# Patient Record
Sex: MALE | Race: WHITE | NOT HISPANIC OR LATINO | Employment: OTHER | URBAN - METROPOLITAN AREA
[De-identification: names, ages, dates, MRNs, and addresses within clinical notes are randomized per-mention and may not be internally consistent; named-entity substitution may affect disease eponyms.]

---

## 2017-01-04 ENCOUNTER — APPOINTMENT (OUTPATIENT)
Dept: LAB | Facility: HOSPITAL | Age: 72
End: 2017-01-04
Payer: MEDICARE

## 2017-01-04 ENCOUNTER — TRANSCRIBE ORDERS (OUTPATIENT)
Dept: LAB | Facility: HOSPITAL | Age: 72
End: 2017-01-04

## 2017-01-04 ENCOUNTER — TRANSCRIBE ORDERS (OUTPATIENT)
Dept: ADMINISTRATIVE | Facility: HOSPITAL | Age: 72
End: 2017-01-04

## 2017-01-04 DIAGNOSIS — D33.2 BENIGN NEOPLASM OF BRAIN, UNSPECIFIED BRAIN REGION (HCC): ICD-10-CM

## 2017-01-04 DIAGNOSIS — I45.5: ICD-10-CM

## 2017-01-04 DIAGNOSIS — C88.0 MACROGLOBULINEMIA (HCC): ICD-10-CM

## 2017-01-04 DIAGNOSIS — D49.6 BRAIN TUMOR (HCC): Primary | ICD-10-CM

## 2017-01-04 DIAGNOSIS — G40.109 SPECIAL SENSORY ATTACKS (HCC): ICD-10-CM

## 2017-01-04 DIAGNOSIS — R73.09 OTHER ABNORMAL GLUCOSE: ICD-10-CM

## 2017-01-04 DIAGNOSIS — R73.09 OTHER ABNORMAL GLUCOSE: Primary | ICD-10-CM

## 2017-01-04 DIAGNOSIS — H65.91 RIGHT OTITIS MEDIA WITH EFFUSION: Primary | ICD-10-CM

## 2017-01-04 LAB
ALBUMIN SERPL BCP-MCNC: 3.2 G/DL (ref 3.5–5)
ALP SERPL-CCNC: 88 U/L (ref 46–116)
ALT SERPL W P-5'-P-CCNC: 20 U/L (ref 12–78)
ANION GAP SERPL CALCULATED.3IONS-SCNC: 7 MMOL/L (ref 4–13)
AST SERPL W P-5'-P-CCNC: 14 U/L (ref 5–45)
BILIRUB SERPL-MCNC: 0.28 MG/DL (ref 0.2–1)
BUN SERPL-MCNC: 12 MG/DL (ref 5–25)
CALCIUM SERPL-MCNC: 9.2 MG/DL (ref 8.3–10.1)
CHLORIDE SERPL-SCNC: 106 MMOL/L (ref 100–108)
CO2 SERPL-SCNC: 27 MMOL/L (ref 21–32)
CREAT SERPL-MCNC: 0.88 MG/DL (ref 0.6–1.3)
GFR SERPL CREATININE-BSD FRML MDRD: >60 ML/MIN/1.73SQ M
GLUCOSE SERPL-MCNC: 106 MG/DL (ref 65–140)
POTASSIUM SERPL-SCNC: 4.1 MMOL/L (ref 3.5–5.3)
PROT SERPL-MCNC: 9 G/DL (ref 6.4–8.2)
SODIUM SERPL-SCNC: 140 MMOL/L (ref 136–145)

## 2017-01-04 PROCEDURE — 85810 BLOOD VISCOSITY EXAMINATION: CPT

## 2017-01-04 PROCEDURE — 80053 COMPREHEN METABOLIC PANEL: CPT

## 2017-01-04 PROCEDURE — 36415 COLL VENOUS BLD VENIPUNCTURE: CPT

## 2017-01-05 LAB — VISC SER: 2.4 REL.SALINE (ref 1.6–1.9)

## 2017-01-26 ENCOUNTER — HOSPITAL ENCOUNTER (OUTPATIENT)
Dept: CT IMAGING | Facility: HOSPITAL | Age: 72
Discharge: HOME | End: 2017-01-26
Payer: MEDICARE

## 2017-01-26 DIAGNOSIS — H65.91 RIGHT OTITIS MEDIA WITH EFFUSION: ICD-10-CM

## 2017-01-26 PROCEDURE — 70480 CT ORBIT/EAR/FOSSA W/O DYE: CPT

## 2017-03-23 ENCOUNTER — APPOINTMENT (OUTPATIENT)
Dept: LAB | Facility: HOSPITAL | Age: 72
End: 2017-03-23
Payer: MEDICARE

## 2017-03-23 ENCOUNTER — TRANSCRIBE ORDERS (OUTPATIENT)
Dept: LAB | Facility: HOSPITAL | Age: 72
End: 2017-03-23

## 2017-03-23 DIAGNOSIS — D32.9 BENIGN NEOPLASM OF MENINGES (HCC): ICD-10-CM

## 2017-03-23 DIAGNOSIS — D32.9 BENIGN NEOPLASM OF MENINGES (HCC): Primary | ICD-10-CM

## 2017-03-23 LAB
BUN SERPL-MCNC: 18 MG/DL (ref 5–25)
CREAT SERPL-MCNC: 0.94 MG/DL (ref 0.6–1.3)
GFR SERPL CREATININE-BSD FRML MDRD: >60 ML/MIN/1.73SQ M

## 2017-03-23 PROCEDURE — 36415 COLL VENOUS BLD VENIPUNCTURE: CPT

## 2017-03-23 PROCEDURE — 84520 ASSAY OF UREA NITROGEN: CPT

## 2017-03-23 PROCEDURE — 82565 ASSAY OF CREATININE: CPT

## 2017-03-31 ENCOUNTER — HOSPITAL ENCOUNTER (OUTPATIENT)
Dept: MRI IMAGING | Facility: HOSPITAL | Age: 72
Discharge: HOME | End: 2017-03-31
Payer: MEDICARE

## 2017-03-31 DIAGNOSIS — D49.6 BRAIN TUMOR (HCC): ICD-10-CM

## 2017-03-31 PROCEDURE — 70553 MRI BRAIN STEM W/O & W/DYE: CPT

## 2017-03-31 PROCEDURE — A9585 GADOBUTROL INJECTION: HCPCS | Performed by: RADIOLOGY

## 2017-03-31 RX ADMIN — GADOBUTROL 11 ML: 604.72 INJECTION INTRAVENOUS at 20:40

## 2017-10-05 ENCOUNTER — APPOINTMENT (OUTPATIENT)
Dept: LAB | Facility: HOSPITAL | Age: 72
End: 2017-10-05
Payer: MEDICARE

## 2017-10-05 ENCOUNTER — TRANSCRIBE ORDERS (OUTPATIENT)
Dept: LAB | Facility: HOSPITAL | Age: 72
End: 2017-10-05

## 2017-10-05 DIAGNOSIS — D33.3 CEREBELLOPONTINE ANGLE SYNDROME (HCC): ICD-10-CM

## 2017-10-05 DIAGNOSIS — R60.9 EDEMA, UNSPECIFIED TYPE: Primary | ICD-10-CM

## 2017-10-05 DIAGNOSIS — R60.9 EDEMA, UNSPECIFIED TYPE: ICD-10-CM

## 2017-10-05 DIAGNOSIS — R73.09 OTHER ABNORMAL GLUCOSE: ICD-10-CM

## 2017-10-05 DIAGNOSIS — C88.0 MACROGLOBULINEMIA (HCC): ICD-10-CM

## 2017-10-05 DIAGNOSIS — G40.109 SPECIAL SENSORY ATTACKS (HCC): ICD-10-CM

## 2017-10-05 LAB
ANION GAP SERPL CALCULATED.3IONS-SCNC: 5 MMOL/L (ref 4–13)
ANION GAP SERPL CALCULATED.3IONS-SCNC: 8 MMOL/L (ref 4–13)
BUN SERPL-MCNC: 13 MG/DL (ref 5–25)
CALCIUM SERPL-MCNC: 9.4 MG/DL (ref 8.3–10.1)
CHLORIDE SERPL-SCNC: 103 MMOL/L (ref 100–108)
CHLORIDE SERPL-SCNC: 103 MMOL/L (ref 100–108)
CO2 SERPL-SCNC: 27 MMOL/L (ref 21–32)
CO2 SERPL-SCNC: 29 MMOL/L (ref 21–32)
CREAT SERPL-MCNC: 1.07 MG/DL (ref 0.6–1.3)
GFR SERPL CREATININE-BSD FRML MDRD: 68 ML/MIN/1.73SQ M
GLUCOSE SERPL-MCNC: 108 MG/DL (ref 65–140)
POTASSIUM SERPL-SCNC: 4 MMOL/L (ref 3.5–5.3)
POTASSIUM SERPL-SCNC: 4.1 MMOL/L (ref 3.5–5.3)
SODIUM SERPL-SCNC: 137 MMOL/L (ref 136–145)
SODIUM SERPL-SCNC: 138 MMOL/L (ref 136–145)

## 2017-10-05 PROCEDURE — 80051 ELECTROLYTE PANEL: CPT

## 2017-10-05 PROCEDURE — 80048 BASIC METABOLIC PNL TOTAL CA: CPT

## 2017-10-05 PROCEDURE — 36415 COLL VENOUS BLD VENIPUNCTURE: CPT

## 2017-11-28 ENCOUNTER — HOSPITAL ENCOUNTER (OUTPATIENT)
Dept: NON INVASIVE DIAGNOSTICS | Facility: CLINIC | Age: 72
Discharge: HOME | End: 2017-11-28
Payer: MEDICARE

## 2017-11-28 ENCOUNTER — TRANSCRIBE ORDERS (OUTPATIENT)
Dept: ADMINISTRATIVE | Facility: HOSPITAL | Age: 72
End: 2017-11-28

## 2017-11-28 DIAGNOSIS — R60.9 EDEMA, UNSPECIFIED TYPE: ICD-10-CM

## 2017-11-28 DIAGNOSIS — I82.4Y2 DEEP VEIN THROMBOSIS (DVT) OF PROXIMAL VEIN OF LEFT LOWER EXTREMITY, UNSPECIFIED CHRONICITY (HCC): ICD-10-CM

## 2017-11-28 DIAGNOSIS — R60.9 EDEMA, UNSPECIFIED TYPE: Primary | ICD-10-CM

## 2017-11-28 PROCEDURE — 93970 EXTREMITY STUDY: CPT

## 2017-12-05 ENCOUNTER — APPOINTMENT (OUTPATIENT)
Dept: LAB | Facility: HOSPITAL | Age: 72
End: 2017-12-05
Payer: MEDICARE

## 2017-12-05 DIAGNOSIS — R60.9 EDEMA, UNSPECIFIED TYPE: ICD-10-CM

## 2017-12-05 DIAGNOSIS — I82.4Y2 DEEP VEIN THROMBOSIS (DVT) OF PROXIMAL VEIN OF LEFT LOWER EXTREMITY, UNSPECIFIED CHRONICITY (HCC): ICD-10-CM

## 2017-12-05 LAB
ANION GAP SERPL CALCULATED.3IONS-SCNC: 7 MMOL/L (ref 4–13)
BUN SERPL-MCNC: 17 MG/DL (ref 5–25)
CALCIUM SERPL-MCNC: 9.6 MG/DL (ref 8.3–10.1)
CHLORIDE SERPL-SCNC: 94 MMOL/L (ref 100–108)
CO2 SERPL-SCNC: 33 MMOL/L (ref 21–32)
CREAT SERPL-MCNC: 1.19 MG/DL (ref 0.6–1.3)
GFR SERPL CREATININE-BSD FRML MDRD: 60 ML/MIN/1.73SQ M
GLUCOSE SERPL-MCNC: 107 MG/DL (ref 65–140)
POTASSIUM SERPL-SCNC: 4.1 MMOL/L (ref 3.5–5.3)
SODIUM SERPL-SCNC: 134 MMOL/L (ref 136–145)

## 2017-12-05 PROCEDURE — 80048 BASIC METABOLIC PNL TOTAL CA: CPT

## 2017-12-05 PROCEDURE — 36415 COLL VENOUS BLD VENIPUNCTURE: CPT

## 2017-12-12 ENCOUNTER — APPOINTMENT (OUTPATIENT)
Dept: LAB | Facility: HOSPITAL | Age: 72
End: 2017-12-12
Payer: MEDICARE

## 2017-12-12 ENCOUNTER — TRANSCRIBE ORDERS (OUTPATIENT)
Dept: LAB | Facility: HOSPITAL | Age: 72
End: 2017-12-12

## 2017-12-12 DIAGNOSIS — I82.4Z2 DEEP VEIN THROMBOSIS (DVT) OF DISTAL VEIN OF LEFT LOWER EXTREMITY, UNSPECIFIED CHRONICITY (HCC): ICD-10-CM

## 2017-12-12 DIAGNOSIS — R60.9 EDEMA, UNSPECIFIED TYPE: Primary | ICD-10-CM

## 2017-12-12 DIAGNOSIS — R60.9 EDEMA, UNSPECIFIED TYPE: ICD-10-CM

## 2017-12-12 LAB
ANION GAP SERPL CALCULATED.3IONS-SCNC: 6 MMOL/L (ref 4–13)
BUN SERPL-MCNC: 12 MG/DL (ref 5–25)
CALCIUM SERPL-MCNC: 9.4 MG/DL (ref 8.3–10.1)
CHLORIDE SERPL-SCNC: 96 MMOL/L (ref 100–108)
CO2 SERPL-SCNC: 33 MMOL/L (ref 21–32)
CREAT SERPL-MCNC: 1.02 MG/DL (ref 0.6–1.3)
GFR SERPL CREATININE-BSD FRML MDRD: 72 ML/MIN/1.73SQ M
GLUCOSE SERPL-MCNC: 111 MG/DL (ref 65–140)
POTASSIUM SERPL-SCNC: 2.9 MMOL/L (ref 3.5–5.3)
SODIUM SERPL-SCNC: 135 MMOL/L (ref 136–145)

## 2017-12-12 PROCEDURE — 36415 COLL VENOUS BLD VENIPUNCTURE: CPT

## 2017-12-12 PROCEDURE — 80048 BASIC METABOLIC PNL TOTAL CA: CPT

## 2017-12-18 ENCOUNTER — APPOINTMENT (OUTPATIENT)
Dept: LAB | Facility: HOSPITAL | Age: 72
End: 2017-12-18
Payer: MEDICARE

## 2017-12-18 DIAGNOSIS — I82.4Z2 DEEP VEIN THROMBOSIS (DVT) OF DISTAL VEIN OF LEFT LOWER EXTREMITY, UNSPECIFIED CHRONICITY (HCC): ICD-10-CM

## 2017-12-18 DIAGNOSIS — R60.9 EDEMA, UNSPECIFIED TYPE: ICD-10-CM

## 2017-12-18 LAB
ANION GAP SERPL CALCULATED.3IONS-SCNC: 6 MMOL/L (ref 4–13)
BUN SERPL-MCNC: 17 MG/DL (ref 5–25)
CALCIUM SERPL-MCNC: 9.6 MG/DL (ref 8.3–10.1)
CHLORIDE SERPL-SCNC: 101 MMOL/L (ref 100–108)
CO2 SERPL-SCNC: 31 MMOL/L (ref 21–32)
CREAT SERPL-MCNC: 0.98 MG/DL (ref 0.6–1.3)
GFR SERPL CREATININE-BSD FRML MDRD: 76 ML/MIN/1.73SQ M
GLUCOSE SERPL-MCNC: 94 MG/DL (ref 65–140)
POTASSIUM SERPL-SCNC: 3.4 MMOL/L (ref 3.5–5.3)
SODIUM SERPL-SCNC: 138 MMOL/L (ref 136–145)

## 2017-12-18 PROCEDURE — 80048 BASIC METABOLIC PNL TOTAL CA: CPT

## 2017-12-18 PROCEDURE — 36415 COLL VENOUS BLD VENIPUNCTURE: CPT

## 2017-12-19 ENCOUNTER — TRANSCRIBE ORDERS (OUTPATIENT)
Dept: ADMINISTRATIVE | Facility: HOSPITAL | Age: 72
End: 2017-12-19

## 2017-12-19 DIAGNOSIS — R60.9 EDEMA, UNSPECIFIED TYPE: Primary | ICD-10-CM

## 2017-12-28 ENCOUNTER — GENERIC CONVERSION - ENCOUNTER (OUTPATIENT)
Dept: OTHER | Facility: OTHER | Age: 72
End: 2017-12-28

## 2017-12-28 ENCOUNTER — HOSPITAL ENCOUNTER (OUTPATIENT)
Dept: NON INVASIVE DIAGNOSTICS | Facility: CLINIC | Age: 72
Discharge: HOME | End: 2017-12-28
Payer: MEDICARE

## 2017-12-28 DIAGNOSIS — R60.9 EDEMA, UNSPECIFIED TYPE: ICD-10-CM

## 2017-12-28 PROCEDURE — 93306 TTE W/DOPPLER COMPLETE: CPT

## 2018-01-02 ENCOUNTER — APPOINTMENT (OUTPATIENT)
Dept: LAB | Facility: HOSPITAL | Age: 73
End: 2018-01-02
Payer: MEDICARE

## 2018-01-02 DIAGNOSIS — R60.9 EDEMA, UNSPECIFIED TYPE: ICD-10-CM

## 2018-01-02 DIAGNOSIS — I82.4Z2 DEEP VEIN THROMBOSIS (DVT) OF DISTAL VEIN OF LEFT LOWER EXTREMITY, UNSPECIFIED CHRONICITY (HCC): ICD-10-CM

## 2018-01-02 LAB
ANION GAP SERPL CALCULATED.3IONS-SCNC: 6 MMOL/L (ref 4–13)
BUN SERPL-MCNC: 16 MG/DL (ref 5–25)
CALCIUM SERPL-MCNC: 9.8 MG/DL (ref 8.3–10.1)
CHLORIDE SERPL-SCNC: 97 MMOL/L (ref 100–108)
CO2 SERPL-SCNC: 34 MMOL/L (ref 21–32)
CREAT SERPL-MCNC: 1.02 MG/DL (ref 0.6–1.3)
GFR SERPL CREATININE-BSD FRML MDRD: 72 ML/MIN/1.73SQ M
GLUCOSE SERPL-MCNC: 103 MG/DL (ref 65–140)
POTASSIUM SERPL-SCNC: 3.2 MMOL/L (ref 3.5–5.3)
SODIUM SERPL-SCNC: 137 MMOL/L (ref 136–145)

## 2018-01-02 PROCEDURE — 36415 COLL VENOUS BLD VENIPUNCTURE: CPT

## 2018-01-02 PROCEDURE — 80048 BASIC METABOLIC PNL TOTAL CA: CPT

## 2018-01-25 ENCOUNTER — TRANSCRIBE ORDERS (OUTPATIENT)
Dept: LAB | Facility: HOSPITAL | Age: 73
End: 2018-01-25

## 2018-01-25 ENCOUNTER — APPOINTMENT (OUTPATIENT)
Dept: LAB | Facility: HOSPITAL | Age: 73
End: 2018-01-25
Payer: MEDICARE

## 2018-01-25 DIAGNOSIS — R60.9 EDEMA, UNSPECIFIED TYPE: ICD-10-CM

## 2018-01-25 DIAGNOSIS — I82.492 DEEP VEIN THROMBOSIS (DVT) OF OTHER VEIN OF LEFT LOWER EXTREMITY, UNSPECIFIED CHRONICITY (HCC): ICD-10-CM

## 2018-01-25 DIAGNOSIS — R60.9 EDEMA, UNSPECIFIED TYPE: Primary | ICD-10-CM

## 2018-01-25 LAB
ANION GAP SERPL CALCULATED.3IONS-SCNC: 7 MMOL/L (ref 4–13)
BUN SERPL-MCNC: 12 MG/DL (ref 5–25)
CALCIUM SERPL-MCNC: 9.6 MG/DL (ref 8.3–10.1)
CHLORIDE SERPL-SCNC: 96 MMOL/L (ref 100–108)
CO2 SERPL-SCNC: 33 MMOL/L (ref 21–32)
CREAT SERPL-MCNC: 1.03 MG/DL (ref 0.6–1.3)
GFR SERPL CREATININE-BSD FRML MDRD: 71 ML/MIN/1.73SQ M
GLUCOSE SERPL-MCNC: 93 MG/DL (ref 65–140)
POTASSIUM SERPL-SCNC: 4.2 MMOL/L (ref 3.5–5.3)
SODIUM SERPL-SCNC: 136 MMOL/L (ref 136–145)

## 2018-01-25 PROCEDURE — 80048 BASIC METABOLIC PNL TOTAL CA: CPT

## 2018-01-25 PROCEDURE — 36415 COLL VENOUS BLD VENIPUNCTURE: CPT

## 2018-03-09 ENCOUNTER — TRANSCRIBE ORDERS (OUTPATIENT)
Dept: LAB | Facility: HOSPITAL | Age: 73
End: 2018-03-09

## 2018-03-09 ENCOUNTER — APPOINTMENT (OUTPATIENT)
Dept: LAB | Facility: HOSPITAL | Age: 73
End: 2018-03-09
Payer: MEDICARE

## 2018-03-09 DIAGNOSIS — E40 EDEMA DUE TO KWASHIORKOR (HCC): ICD-10-CM

## 2018-03-09 DIAGNOSIS — I82.402 DEEP VEIN THROMBOSIS (DVT) OF LEFT LOWER EXTREMITY, UNSPECIFIED CHRONICITY, UNSPECIFIED VEIN (HCC): ICD-10-CM

## 2018-03-09 DIAGNOSIS — R60.9 EDEMA, UNSPECIFIED TYPE: ICD-10-CM

## 2018-03-09 DIAGNOSIS — I82.4Z2 DEEP VEIN THROMBOSIS (DVT) OF DISTAL VEIN OF LEFT LOWER EXTREMITY, UNSPECIFIED CHRONICITY (HCC): ICD-10-CM

## 2018-03-09 DIAGNOSIS — E40 EDEMA DUE TO KWASHIORKOR (HCC): Primary | ICD-10-CM

## 2018-03-09 LAB
ANION GAP SERPL CALCULATED.3IONS-SCNC: 9 MMOL/L (ref 4–13)
BUN SERPL-MCNC: 12 MG/DL (ref 5–25)
CALCIUM SERPL-MCNC: 9.4 MG/DL (ref 8.3–10.1)
CHLORIDE SERPL-SCNC: 95 MMOL/L (ref 100–108)
CO2 SERPL-SCNC: 29 MMOL/L (ref 21–32)
CREAT SERPL-MCNC: 1.07 MG/DL (ref 0.6–1.3)
GFR SERPL CREATININE-BSD FRML MDRD: 68 ML/MIN/1.73SQ M
GLUCOSE P FAST SERPL-MCNC: 91 MG/DL (ref 65–99)
POTASSIUM SERPL-SCNC: 2.7 MMOL/L (ref 3.5–5.3)
SODIUM SERPL-SCNC: 133 MMOL/L (ref 136–145)

## 2018-03-09 PROCEDURE — 36415 COLL VENOUS BLD VENIPUNCTURE: CPT

## 2018-03-09 PROCEDURE — 80048 BASIC METABOLIC PNL TOTAL CA: CPT

## 2018-03-13 ENCOUNTER — APPOINTMENT (OUTPATIENT)
Dept: LAB | Facility: HOSPITAL | Age: 73
End: 2018-03-13
Payer: MEDICARE

## 2018-03-13 DIAGNOSIS — E40 EDEMA DUE TO KWASHIORKOR (HCC): ICD-10-CM

## 2018-03-13 DIAGNOSIS — I82.402 DEEP VEIN THROMBOSIS (DVT) OF LEFT LOWER EXTREMITY, UNSPECIFIED CHRONICITY, UNSPECIFIED VEIN (HCC): ICD-10-CM

## 2018-03-13 LAB
ANION GAP SERPL CALCULATED.3IONS-SCNC: 6 MMOL/L (ref 4–13)
BUN SERPL-MCNC: 14 MG/DL (ref 5–25)
CALCIUM SERPL-MCNC: 9.6 MG/DL (ref 8.3–10.1)
CHLORIDE SERPL-SCNC: 98 MMOL/L (ref 100–108)
CO2 SERPL-SCNC: 31 MMOL/L (ref 21–32)
CREAT SERPL-MCNC: 0.98 MG/DL (ref 0.6–1.3)
GFR SERPL CREATININE-BSD FRML MDRD: 76 ML/MIN/1.73SQ M
GLUCOSE P FAST SERPL-MCNC: 102 MG/DL (ref 65–99)
POTASSIUM SERPL-SCNC: 3.1 MMOL/L (ref 3.5–5.3)
SODIUM SERPL-SCNC: 135 MMOL/L (ref 136–145)

## 2018-03-13 PROCEDURE — 80048 BASIC METABOLIC PNL TOTAL CA: CPT

## 2018-03-13 PROCEDURE — 36415 COLL VENOUS BLD VENIPUNCTURE: CPT

## 2018-03-16 ENCOUNTER — APPOINTMENT (OUTPATIENT)
Dept: LAB | Facility: HOSPITAL | Age: 73
End: 2018-03-16
Payer: MEDICARE

## 2018-03-16 DIAGNOSIS — R60.9 EDEMA, UNSPECIFIED TYPE: ICD-10-CM

## 2018-03-16 DIAGNOSIS — I82.4Z2 DEEP VEIN THROMBOSIS (DVT) OF DISTAL VEIN OF LEFT LOWER EXTREMITY, UNSPECIFIED CHRONICITY (HCC): ICD-10-CM

## 2018-03-16 DIAGNOSIS — E40 EDEMA DUE TO KWASHIORKOR (HCC): ICD-10-CM

## 2018-03-16 DIAGNOSIS — I82.402 DEEP VEIN THROMBOSIS (DVT) OF LEFT LOWER EXTREMITY, UNSPECIFIED CHRONICITY, UNSPECIFIED VEIN (HCC): ICD-10-CM

## 2018-03-16 LAB
ANION GAP SERPL CALCULATED.3IONS-SCNC: 5 MMOL/L (ref 4–13)
BUN SERPL-MCNC: 6 MG/DL (ref 5–25)
CALCIUM SERPL-MCNC: 9.2 MG/DL (ref 8.3–10.1)
CHLORIDE SERPL-SCNC: 102 MMOL/L (ref 100–108)
CO2 SERPL-SCNC: 29 MMOL/L (ref 21–32)
CREAT SERPL-MCNC: 0.99 MG/DL (ref 0.6–1.3)
GFR SERPL CREATININE-BSD FRML MDRD: 75 ML/MIN/1.73SQ M
GLUCOSE SERPL-MCNC: 80 MG/DL (ref 65–140)
POTASSIUM SERPL-SCNC: 3.6 MMOL/L (ref 3.5–5.3)
SODIUM SERPL-SCNC: 136 MMOL/L (ref 136–145)

## 2018-03-16 PROCEDURE — 80048 BASIC METABOLIC PNL TOTAL CA: CPT

## 2018-03-16 PROCEDURE — 36415 COLL VENOUS BLD VENIPUNCTURE: CPT

## 2018-03-23 ENCOUNTER — APPOINTMENT (OUTPATIENT)
Dept: LAB | Facility: HOSPITAL | Age: 73
End: 2018-03-23
Payer: MEDICARE

## 2018-03-23 DIAGNOSIS — R60.9 EDEMA, UNSPECIFIED TYPE: ICD-10-CM

## 2018-03-23 DIAGNOSIS — I82.402 DEEP VEIN THROMBOSIS (DVT) OF LEFT LOWER EXTREMITY, UNSPECIFIED CHRONICITY, UNSPECIFIED VEIN (HCC): ICD-10-CM

## 2018-03-23 DIAGNOSIS — I82.4Z2 DEEP VEIN THROMBOSIS (DVT) OF DISTAL VEIN OF LEFT LOWER EXTREMITY, UNSPECIFIED CHRONICITY (HCC): ICD-10-CM

## 2018-03-23 DIAGNOSIS — E40 EDEMA DUE TO KWASHIORKOR (HCC): ICD-10-CM

## 2018-03-23 LAB
ANION GAP SERPL CALCULATED.3IONS-SCNC: 8 MMOL/L (ref 4–13)
BUN SERPL-MCNC: 12 MG/DL (ref 5–25)
CALCIUM SERPL-MCNC: 9 MG/DL (ref 8.3–10.1)
CHLORIDE SERPL-SCNC: 98 MMOL/L (ref 100–108)
CO2 SERPL-SCNC: 29 MMOL/L (ref 21–32)
CREAT SERPL-MCNC: 1.12 MG/DL (ref 0.6–1.3)
GFR SERPL CREATININE-BSD FRML MDRD: 64 ML/MIN/1.73SQ M
GLUCOSE P FAST SERPL-MCNC: 111 MG/DL (ref 65–99)
POTASSIUM SERPL-SCNC: 3.3 MMOL/L (ref 3.5–5.3)
SODIUM SERPL-SCNC: 135 MMOL/L (ref 136–145)

## 2018-03-23 PROCEDURE — 80048 BASIC METABOLIC PNL TOTAL CA: CPT

## 2018-03-23 PROCEDURE — 36415 COLL VENOUS BLD VENIPUNCTURE: CPT

## 2018-04-02 ENCOUNTER — APPOINTMENT (OUTPATIENT)
Dept: LAB | Facility: HOSPITAL | Age: 73
End: 2018-04-02
Payer: MEDICARE

## 2018-04-02 ENCOUNTER — TRANSCRIBE ORDERS (OUTPATIENT)
Dept: LAB | Facility: HOSPITAL | Age: 73
End: 2018-04-02

## 2018-04-02 DIAGNOSIS — R60.9 EDEMA, UNSPECIFIED TYPE: Primary | ICD-10-CM

## 2018-04-02 DIAGNOSIS — R60.9 EDEMA, UNSPECIFIED TYPE: ICD-10-CM

## 2018-04-02 LAB
ANION GAP SERPL CALCULATED.3IONS-SCNC: 6 MMOL/L (ref 4–13)
BUN SERPL-MCNC: 14 MG/DL (ref 5–25)
CALCIUM SERPL-MCNC: 8.9 MG/DL (ref 8.3–10.1)
CHLORIDE SERPL-SCNC: 103 MMOL/L (ref 100–108)
CO2 SERPL-SCNC: 29 MMOL/L (ref 21–32)
CREAT SERPL-MCNC: 1.03 MG/DL (ref 0.6–1.3)
GFR SERPL CREATININE-BSD FRML MDRD: 71 ML/MIN/1.73SQ M
GLUCOSE SERPL-MCNC: 91 MG/DL (ref 65–140)
POTASSIUM SERPL-SCNC: 3.3 MMOL/L (ref 3.5–5.3)
SODIUM SERPL-SCNC: 138 MMOL/L (ref 136–145)

## 2018-04-02 PROCEDURE — 36415 COLL VENOUS BLD VENIPUNCTURE: CPT

## 2018-04-02 PROCEDURE — 80048 BASIC METABOLIC PNL TOTAL CA: CPT

## 2018-04-16 ENCOUNTER — APPOINTMENT (OUTPATIENT)
Dept: LAB | Facility: HOSPITAL | Age: 73
End: 2018-04-16
Payer: MEDICARE

## 2018-04-16 DIAGNOSIS — R60.9 EDEMA, UNSPECIFIED TYPE: ICD-10-CM

## 2018-04-16 LAB
ANION GAP SERPL CALCULATED.3IONS-SCNC: 7 MMOL/L (ref 4–13)
BUN SERPL-MCNC: 13 MG/DL (ref 5–25)
CALCIUM SERPL-MCNC: 9.4 MG/DL
CHLORIDE SERPL-SCNC: 99 MMOL/L (ref 100–108)
CO2 SERPL-SCNC: 30 MMOL/L (ref 21–32)
CREAT SERPL-MCNC: 1.05 MG/DL (ref 0.6–1.3)
GFR SERPL CREATININE-BSD FRML MDRD: 70 ML/MIN/1.73SQ M
GLUCOSE P FAST SERPL-MCNC: 112 MG/DL (ref 65–99)
POTASSIUM SERPL-SCNC: 3.1 MMOL/L (ref 3.5–5.3)
SODIUM SERPL-SCNC: 136 MMOL/L (ref 136–145)

## 2018-04-16 PROCEDURE — 36415 COLL VENOUS BLD VENIPUNCTURE: CPT

## 2018-04-16 PROCEDURE — 80048 BASIC METABOLIC PNL TOTAL CA: CPT

## 2018-04-23 ENCOUNTER — APPOINTMENT (OUTPATIENT)
Dept: LAB | Facility: HOSPITAL | Age: 73
End: 2018-04-23
Payer: MEDICARE

## 2018-04-23 DIAGNOSIS — R60.9 EDEMA, UNSPECIFIED TYPE: ICD-10-CM

## 2018-04-23 LAB
ANION GAP SERPL CALCULATED.3IONS-SCNC: 8 MMOL/L (ref 4–13)
BUN SERPL-MCNC: 8 MG/DL (ref 5–25)
CALCIUM SERPL-MCNC: 9.5 MG/DL (ref 8.3–10.1)
CHLORIDE SERPL-SCNC: 103 MMOL/L (ref 100–108)
CO2 SERPL-SCNC: 26 MMOL/L (ref 21–32)
CREAT SERPL-MCNC: 1 MG/DL (ref 0.6–1.3)
GFR SERPL CREATININE-BSD FRML MDRD: 74 ML/MIN/1.73SQ M
GLUCOSE P FAST SERPL-MCNC: 104 MG/DL (ref 65–99)
POTASSIUM SERPL-SCNC: 3.6 MMOL/L (ref 3.5–5.3)
SODIUM SERPL-SCNC: 137 MMOL/L (ref 136–145)

## 2018-04-23 PROCEDURE — 80048 BASIC METABOLIC PNL TOTAL CA: CPT

## 2018-04-23 PROCEDURE — 36415 COLL VENOUS BLD VENIPUNCTURE: CPT

## 2018-04-29 ENCOUNTER — HOSPITAL ENCOUNTER (EMERGENCY)
Facility: HOSPITAL | Age: 73
Discharge: HOME | End: 2018-04-29
Attending: EMERGENCY MEDICINE
Payer: MEDICARE

## 2018-04-29 VITALS
HEART RATE: 74 BPM | RESPIRATION RATE: 20 BRPM | TEMPERATURE: 97.5 F | DIASTOLIC BLOOD PRESSURE: 73 MMHG | SYSTOLIC BLOOD PRESSURE: 158 MMHG | OXYGEN SATURATION: 96 % | WEIGHT: 240 LBS | BODY MASS INDEX: 38.57 KG/M2 | HEIGHT: 66 IN

## 2018-04-29 DIAGNOSIS — I87.2 VENOUS (PERIPHERAL) INSUFFICIENCY: ICD-10-CM

## 2018-04-29 DIAGNOSIS — L03.115 CELLULITIS OF BOTH LOWER EXTREMITIES: Primary | ICD-10-CM

## 2018-04-29 DIAGNOSIS — L03.116 CELLULITIS OF BOTH LOWER EXTREMITIES: Primary | ICD-10-CM

## 2018-04-29 LAB
ANION GAP SERPL CALCULATED.3IONS-SCNC: 6 MMOL/L (ref 4–13)
BASOPHILS # BLD AUTO: 0.04 THOUSANDS/ΜL (ref 0–0.1)
BASOPHILS NFR BLD AUTO: 1 % (ref 0–1)
BUN SERPL-MCNC: 15 MG/DL (ref 5–25)
CALCIUM SERPL-MCNC: 9.1 MG/DL (ref 8.3–10.1)
CHLORIDE SERPL-SCNC: 99 MMOL/L (ref 100–108)
CO2 SERPL-SCNC: 33 MMOL/L (ref 21–32)
CREAT SERPL-MCNC: 1.21 MG/DL (ref 0.6–1.3)
EOSINOPHIL # BLD AUTO: 0.26 THOUSAND/ΜL (ref 0–0.61)
EOSINOPHIL NFR BLD AUTO: 3 % (ref 0–6)
ERYTHROCYTE [DISTWIDTH] IN BLOOD BY AUTOMATED COUNT: 15.2 % (ref 11.6–15.1)
GFR SERPL CREATININE-BSD FRML MDRD: 59 ML/MIN/1.73SQ M
GLUCOSE SERPL-MCNC: 91 MG/DL (ref 65–140)
HCT VFR BLD AUTO: 39.4 % (ref 36.5–49.3)
HGB BLD-MCNC: 12.9 G/DL (ref 12–17)
LYMPHOCYTES # BLD AUTO: 1.56 THOUSANDS/ΜL (ref 0.6–4.47)
LYMPHOCYTES NFR BLD AUTO: 19 % (ref 14–44)
MCH RBC QN AUTO: 30.8 PG (ref 26.8–34.3)
MCHC RBC AUTO-ENTMCNC: 32.7 G/DL (ref 31.4–37.4)
MCV RBC AUTO: 94 FL (ref 82–98)
MONOCYTES # BLD AUTO: 0.55 THOUSAND/ΜL (ref 0.17–1.22)
MONOCYTES NFR BLD AUTO: 7 % (ref 4–12)
NEUTROPHILS # BLD AUTO: 5.92 THOUSANDS/ΜL (ref 1.85–7.62)
NEUTS SEG NFR BLD AUTO: 70 % (ref 43–75)
NRBC BLD AUTO-RTO: 0 /100 WBCS
PLATELET # BLD AUTO: 319 THOUSANDS/UL (ref 149–390)
PMV BLD AUTO: 9.7 FL (ref 8.9–12.7)
POTASSIUM SERPL-SCNC: 3.5 MMOL/L (ref 3.5–5.3)
RBC # BLD AUTO: 4.19 MILLION/UL (ref 3.88–5.62)
SODIUM SERPL-SCNC: 138 MMOL/L (ref 136–145)
WBC # BLD AUTO: 8.36 THOUSAND/UL (ref 4.31–10.16)

## 2018-04-29 PROCEDURE — 99283 EMERGENCY DEPT VISIT LOW MDM: CPT

## 2018-04-29 PROCEDURE — 36415 COLL VENOUS BLD VENIPUNCTURE: CPT | Performed by: EMERGENCY MEDICINE

## 2018-04-29 PROCEDURE — 96374 THER/PROPH/DIAG INJ IV PUSH: CPT

## 2018-04-29 PROCEDURE — 85025 COMPLETE CBC W/AUTO DIFF WBC: CPT | Performed by: EMERGENCY MEDICINE

## 2018-04-29 PROCEDURE — 80048 BASIC METABOLIC PNL TOTAL CA: CPT | Performed by: EMERGENCY MEDICINE

## 2018-04-29 RX ORDER — CEPHALEXIN 500 MG/1
500 CAPSULE ORAL 4 TIMES DAILY
Qty: 28 CAPSULE | Refills: 0 | Status: SHIPPED | OUTPATIENT
Start: 2018-04-29 | End: 2018-05-06

## 2018-04-29 RX ORDER — NAPROXEN 500 MG/1
500 TABLET ORAL 2 TIMES DAILY WITH MEALS
Qty: 30 TABLET | Refills: 0 | Status: SHIPPED | OUTPATIENT
Start: 2018-04-29 | End: 2018-09-07

## 2018-04-29 RX ORDER — FUROSEMIDE 40 MG/1
40 TABLET ORAL 2 TIMES DAILY
COMMUNITY

## 2018-04-29 RX ORDER — CEPHALEXIN 250 MG/1
500 CAPSULE ORAL ONCE
Status: COMPLETED | OUTPATIENT
Start: 2018-04-29 | End: 2018-04-29

## 2018-04-29 RX ORDER — KETOROLAC TROMETHAMINE 30 MG/ML
15 INJECTION, SOLUTION INTRAMUSCULAR; INTRAVENOUS ONCE
Status: COMPLETED | OUTPATIENT
Start: 2018-04-29 | End: 2018-04-29

## 2018-04-29 RX ORDER — METOLAZONE 2.5 MG/1
2.5 TABLET ORAL DAILY
COMMUNITY

## 2018-04-29 RX ORDER — POTASSIUM CHLORIDE 750 MG/1
80 TABLET, EXTENDED RELEASE ORAL DAILY
COMMUNITY

## 2018-04-29 RX ORDER — LEVETIRACETAM 750 MG/1
750 TABLET ORAL 2 TIMES DAILY
COMMUNITY
End: 2018-09-07

## 2018-04-29 RX ORDER — GABAPENTIN 100 MG/1
100 CAPSULE ORAL 3 TIMES DAILY
COMMUNITY
End: 2018-09-07

## 2018-04-29 RX ADMIN — CEPHALEXIN 500 MG: 250 CAPSULE ORAL at 22:06

## 2018-04-29 RX ADMIN — KETOROLAC TROMETHAMINE 15 MG: 30 INJECTION, SOLUTION INTRAMUSCULAR at 21:58

## 2018-04-30 NOTE — ED ATTENDING ATTESTATION
Michael Davis DO, saw and evaluated the patient  I have discussed the patient with the resident/non-physician practitioner and agree with the resident's/non-physician practitioner's findings, Plan of Care, and MDM as documented in the resident's/non-physician practitioner's note, except where noted  All available labs and Radiology studies were reviewed  At this point I agree with the current assessment done in the Emergency Department  I have conducted an independent evaluation of this patient a history and physical is as follows:    75 yo male presents for evaluation of progressively worsening BLE redness, swelling, pain and weeping  He states that recently he has had a foul odor from the weeping wounds  Denies f/c  Pain is an aching pain, constant but waxes and wanes  Pt ambulatory without difficulty  Denies radiation of pain  No other c/o at this time  BLE edema, erythema with sharp demarcation at proximal calf  Some weeping clear fluid present  No purulent discharge noted  Imp: venous stasis, possible underlying cellulitis due to skin breakdown  plan: labs, likely start abx          Critical Care Time  CritCare Time    Procedures

## 2018-04-30 NOTE — ED PROVIDER NOTES
This is a 73History  Chief Complaint   Patient presents with    Cellulitis     Per patient, "I have cellulitis on both of my legs, but my left leg is worse  The water blisters are now leaking all over the floor "     HPI  This is a 57-year-old male that presents today with valuation of lower extremity edema, redness, pain and discharge and foul odor  Patient states since November he notices bilateral lower extremities have been edematous  He followed up with his PCP who gave him some Lasix which she has been compliant with  He states the redness has progressively gotten worse  He states pain since the blisters rupture and irritation from his pants  He denies any fevers or chills  He states he does have an appointment on May 10th to follow up with vascular surgery  He denies any abdominal pain  No urinary complaints  No back pain  No buttocks pain  No scrotal pain  No weakness numbness or tingling  History of DVT when he had his brain cancer  Denies any calf pain  Stat 57-year-old male that presents today with lower extremity edema redness  Patient appears to have venous stasis but also some mild cellulitis  Will get basic labs  Will treat with antibiotics  Patient will be discharged home and discussed with daughter at bedside who was a nurse if any worsening of symptoms please bring patient back to the ER to get evaluated  Prior to Admission Medications   Prescriptions Last Dose Informant Patient Reported?  Taking?   furosemide (LASIX) 40 mg tablet 4/29/2018 at afternoon  Yes Yes   Sig: Take 40 mg by mouth 2 (two) times a day   gabapentin (NEURONTIN) 100 mg capsule 4/29/2018 at morning  Yes Yes   Sig: Take 100 mg by mouth 3 (three) times a day   levETIRAcetam (KEPPRA) 750 mg tablet 4/29/2018 at evening  Yes Yes   Sig: Take 750 mg by mouth 2 (two) times a day   metolazone (ZAROXOLYN) 2 5 mg tablet Past Week at unknown  Yes Yes   Sig: Take 2 5 mg by mouth daily Take one tab by mouth m, w, fri potassium chloride (K-DUR,KLOR-CON) 10 mEq tablet 4/29/2018 at evening  Yes Yes   Sig: Take 20 mEq by mouth 3 (three) times a day      Facility-Administered Medications: None       Past Medical History:   Diagnosis Date    Cancer [facility])        Past Surgical History:   Procedure Laterality Date    BRAIN SURGERY         History reviewed  No pertinent family history  I have reviewed and agree with the history as documented  Social History   Substance Use Topics    Smoking status: Former Smoker    Smokeless tobacco: Never Used      Comment: quit 45 years ago    Alcohol use No        Review of Systems   Constitutional: Negative  Negative for diaphoresis and fever  HENT: Negative  Respiratory: Negative  Negative for cough, shortness of breath and wheezing  Cardiovascular: Negative  Negative for chest pain, palpitations and leg swelling  Gastrointestinal: Negative for abdominal distention, abdominal pain, nausea and vomiting  Genitourinary: Negative  Musculoskeletal:        Leg pain   Skin: Negative  Neurological: Negative  Psychiatric/Behavioral: Negative  All other systems reviewed and are negative  Physical Exam  ED Triage Vitals   Temperature Pulse Respirations Blood Pressure SpO2   04/29/18 2036 04/29/18 2036 04/29/18 2036 04/29/18 2036 04/29/18 2036   97 5 °F (36 4 °C) 81 20 132/58 100 %      Temp Source Heart Rate Source Patient Position - Orthostatic VS BP Location FiO2 (%)   04/29/18 2036 04/29/18 2036 04/29/18 2036 04/29/18 2036 --   Oral Monitor Sitting Left arm       Pain Score       04/29/18 2158       Worst Possible Pain           Orthostatic Vital Signs  Vitals:    04/29/18 2036 04/29/18 2100 04/29/18 2320   BP: 132/58 158/73 158/73   Pulse: 81 74 74   Patient Position - Orthostatic VS: Sitting  Lying       Physical Exam   Constitutional: He is oriented to person, place, and time  He appears well-developed and well-nourished  No distress     HENT:   Head: Normocephalic and atraumatic  Nose: Nose normal    Mouth/Throat: Oropharynx is clear and moist    Eyes: Conjunctivae and EOM are normal  Pupils are equal, round, and reactive to light  Neck: Normal range of motion  Neck supple  Cardiovascular: Normal rate, regular rhythm and normal heart sounds  No murmur heard  Pulmonary/Chest: Effort normal and breath sounds normal  No respiratory distress  He has no wheezes  He has no rales  Abdominal: Soft  Bowel sounds are normal  He exhibits no distension  There is no tenderness  There is no rebound and no guarding  Musculoskeletal: Normal range of motion  He exhibits edema and tenderness  He exhibits no deformity  Bilateral lower extremity edema, redness, clusters, slightly warm touch  Tender to palpation of her blisters ruptured  Normal 2+ DP pulses  Normal strength and sensation  Neurological: He is alert and oriented to person, place, and time  No cranial nerve deficit  Skin: Skin is warm and dry  No rash noted  He is not diaphoretic  No pallor  Psychiatric: He has a normal mood and affect  Vitals reviewed        ED Medications  Medications   ketorolac (TORADOL) injection 15 mg (15 mg Intravenous Given 4/29/18 2158)   cephalexin (KEFLEX) capsule 500 mg (500 mg Oral Given 4/29/18 2206)       Diagnostic Studies  Results Reviewed     Procedure Component Value Units Date/Time    Basic metabolic panel [[id number]]  (Abnormal) Collected:  04/29/18 2208    Lab Status:  Final result Specimen:  Blood from Hand, Left Updated:  04/29/18 2241     Sodium 138 mmol/L      Potassium 3 5 mmol/L      Chloride 99 (L) mmol/L      CO2 33 (H) mmol/L      Anion Gap 6 mmol/L      BUN 15 mg/dL      Creatinine 1 21 mg/dL      Glucose 91 mg/dL      Calcium 9 1 mg/dL      eGFR 59 ml/min/1 73sq m     Narrative:         National Kidney Disease Education Program recommendations are as follows:  GFR calculation is accurate only with a steady state creatinine  Chronic Kidney disease less than 60 ml/min/1 73 sq  meters  Kidney failure less than 15 ml/min/1 73 sq  meters  CBC and differential [[id number]]  (Abnormal) Collected:  04/29/18 2208    Lab Status:  Final result Specimen:  Blood from Hand, Left Updated:  04/29/18 2217     WBC 8 36 Thousand/uL      RBC 4 19 Million/uL      Hemoglobin 12 9 g/dL      Hematocrit 39 4 %      MCV 94 fL      MCH 30 8 pg      MCHC 32 7 g/dL      RDW 15 2 (H) %      MPV 9 7 fL      Platelets 807 Thousands/uL      nRBC 0 /100 WBCs      Neutrophils Relative 70 %      Lymphocytes Relative 19 %      Monocytes Relative 7 %      Eosinophils Relative 3 %      Basophils Relative 1 %      Neutrophils Absolute 5 92 Thousands/µL      Lymphocytes Absolute 1 56 Thousands/µL      Monocytes Absolute 0 55 Thousand/µL      Eosinophils Absolute 0 26 Thousand/µL      Basophils Absolute 0 04 Thousands/µL                  No orders to display         Procedures  Procedures      Phone Consults  ED Phone Contact    ED Course                               MDM  CritCare Time    Disposition  Final diagnoses:   Cellulitis of both lower extremities   Venous (peripheral) insufficiency     Time reflects when diagnosis was documented in both MDM as applicable and the Disposition within this note     Time User Action Codes Description Comment    4/29/2018 11:10 PM [address]  [L03 [id number],  V76 [id number]] Cellulitis of both lower extremities     4/29/2018 11:10 PM [address] [I87 2] Venous (peripheral) insufficiency       ED Disposition     ED Disposition Condition Comment    Discharge  [address] discharge to home/self care      Condition at discharge: Good        Follow-up Information     Follow up With Specialties Details Why Contact Ranjit Vasquez, DO Wound Care, Family Medicine Schedule an appointment as soon as possible for a visit  [address] [place] [address]  [phone]          Discharge Medication List as of 4/29/2018 11:11 PM      START taking these medications    Details   cephalexin (KEFLEX) 500 mg capsule Take 1 capsule (500 mg total) by mouth 4 (four) times a day for 7 days, Starting Sun 4/29/2018, Until Sun 5/6/2018, Print         CONTINUE these medications which have NOT CHANGED    Details   furosemide (LASIX) 40 mg tablet Take 40 mg by mouth 2 (two) times a day, Historical Med      gabapentin (NEURONTIN) 100 mg capsule Take 100 mg by mouth 3 (three) times a day, Historical Med      levETIRAcetam (KEPPRA) 750 mg tablet Take 750 mg by mouth 2 (two) times a day, Historical Med      metolazone (ZAROXOLYN) 2 5 mg tablet Take 2 5 mg by mouth daily Take one tab by mouth m, w, fri, Historical Med      potassium chloride (K-DUR,KLOR-CON) 10 mEq tablet Take 20 mEq by mouth 3 (three) times a day, Historical Med           No discharge procedures on file  ED Provider  Attending physically available and evaluated Vernon Wilder I managed the patient along with the ED Attending      Electronically Signed by         Greg Ng MD  04/30/18 0842

## 2018-04-30 NOTE — ED NOTES
Patient ambulatory to restroom with my assistance  I asked patient if he wanted to apply shoes or slipper socks before walking to restroom and he refused        Tamara Garber  04/29/18 2129

## 2018-04-30 NOTE — DISCHARGE INSTRUCTIONS
Follow up with here vascular surgery appointment  Also follow up with wound care  Elevated lower extremities  If no improvement of symptoms or systemic signs of just fevers or chills please return to the emergency department      Cellulitis   WHAT YOU NEED TO KNOW:   Cellulitis is a skin infection caused by bacteria  Cellulitis may go away on its own or you may need treatment  Your healthcare provider may draw a [place] around the outside edges of your cellulitis  If your cellulitis spreads, your healthcare provider will see it outside of the [place]  DISCHARGE INSTRUCTIONS:   Call 911 if:   · You have sudden trouble breathing or chest pain  Seek care immediately if:   · Your wound gets larger and more painful  · You feel a crackling under your skin when you touch it  · You have purple dots or bumps on your skin, or you see bleeding under your skin  · You have new swelling and pain in your legs  · The red, warm, swollen area gets larger  · You see red streaks coming from the infected area  Contact your healthcare provider if:   · You have a fever  · Your fever or pain does not go away or gets worse  · The area does not get smaller after 2 days of antibiotics  · Your skin is flaking or peeling off  · You have questions or concerns about your condition or care  Medicines:   · Antibiotics  help treat the bacterial infection  · NSAIDs , such as ibuprofen, help decrease swelling, pain, and fever  NSAIDs can cause stomach bleeding or kidney problems in certain people  If you take blood thinner medicine, always ask if NSAIDs are safe for you  Always read the medicine label and follow directions  Do not give these medicines to children under 10months of age without direction from your child's healthcare provider  · Acetaminophen  decreases pain and fever  It is available without a doctor's order  Ask how much to take and how often to take it  Follow directions   Read the labels of all other medicines you are using to see if they also contain acetaminophen, or ask your doctor or pharmacist  Acetaminophen can cause liver damage if not taken correctly  Do not use more than 4 grams (4,000 milligrams) total of acetaminophen in one day  · Take your medicine as directed  Contact your healthcare provider if you think your medicine is not helping or if you have side effects  Tell him or her if you are allergic to any medicine  Keep a list of the medicines, vitamins, and herbs you take  Include the amounts, and when and why you take them  Bring the list or the pill bottles to follow-up visits  Carry your medicine list with you in case of an emergency  Self-care:   · Elevate the area above the level of your heart  as often as you can  This will help decrease swelling and pain  Prop the area on pillows or blankets to keep it elevated comfortably  · Clean the area daily until the wound scabs over  Gently wash the area with soap and water  Pat dry  Use dressings as directed  · Place cool or warm, wet cloths on the area as directed  Use clean cloths and clean water  Leave it on the area until the cloth is room temperature  Pat the area dry with a clean, dry cloth  The cloths may help decrease pain  Prevent cellulitis:   · Do not scratch bug bites or areas of injury  You increase your risk for cellulitis by scratching these areas  · Do not share personal items, such as towels, clothing, and razors  · Clean exercise equipment  with germ-killing  before and after you use it  · Wash your hands often  Use soap and water  Wash your hands after you use the bathroom, change a child's diapers, or sneeze  Wash your hands before you prepare or eat food  Use lotion to prevent dry, cracked skin  · Wear pressure stockings as directed  You may be told to wear the stockings if you have peripheral edema   The stockings improve blood flow and decrease swelling  · Treat athlete's foot  This can help prevent the spread of a bacterial skin infection  Follow up with your healthcare provider within 3 days, or as directed: Your healthcare provider will check if your cellulitis is getting better  You may need different medicine  Write down your questions so you remember to ask them during your visits  © 2017 [address] [employer] Information is for End User's use only and may not be sold, redistributed or otherwise used for commercial purposes  All illustrations and images included in CareNotes® are the copyrighted property of [employer]  or [employer]  The above information is an  only  It is not intended as medical advice for individual conditions or treatments  Talk to your doctor, nurse or pharmacist before following any medical regimen to see if it is safe and effective for you

## 2018-05-04 ENCOUNTER — APPOINTMENT (OUTPATIENT)
Dept: WOUND CARE | Facility: HOSPITAL | Age: 73
End: 2018-05-04
Payer: MEDICARE

## 2018-05-04 PROCEDURE — 99213 OFFICE O/P EST LOW 20 MIN: CPT

## 2018-05-04 PROCEDURE — 97597 DBRDMT OPN WND 1ST 20 CM/<: CPT

## 2018-05-04 PROCEDURE — 97598 DBRDMT OPN WND ADDL 20CM/<: CPT

## 2018-05-10 ENCOUNTER — OFFICE VISIT (OUTPATIENT)
Dept: VASCULAR SURGERY | Facility: CLINIC | Age: 73
End: 2018-05-10
Payer: MEDICARE

## 2018-05-10 VITALS
HEART RATE: 74 BPM | TEMPERATURE: 97.6 F | DIASTOLIC BLOOD PRESSURE: 80 MMHG | SYSTOLIC BLOOD PRESSURE: 128 MMHG | WEIGHT: 246 LBS | RESPIRATION RATE: 18 BRPM | BODY MASS INDEX: 38.61 KG/M2 | HEIGHT: 67 IN

## 2018-05-10 DIAGNOSIS — L97.911 ULCERS OF BOTH LOWER EXTREMITIES, LIMITED TO BREAKDOWN OF SKIN (HCC): ICD-10-CM

## 2018-05-10 DIAGNOSIS — L97.921 ULCERS OF BOTH LOWER EXTREMITIES, LIMITED TO BREAKDOWN OF SKIN (HCC): ICD-10-CM

## 2018-05-10 DIAGNOSIS — R60.0 BILATERAL LOWER EXTREMITY EDEMA: Primary | ICD-10-CM

## 2018-05-10 PROBLEM — L97.929 BILATERAL LEG ULCER (HCC): Status: ACTIVE | Noted: 2018-05-10

## 2018-05-10 PROBLEM — L97.919 BILATERAL LEG ULCER (HCC): Status: ACTIVE | Noted: 2018-05-10

## 2018-05-10 PROBLEM — Z95.828 H/O SUPERIOR VENA CAVA FILTER PLACEMENT: Status: ACTIVE | Noted: 2018-05-10

## 2018-05-10 PROCEDURE — 99204 OFFICE O/P NEW MOD 45 MIN: CPT | Performed by: NURSE PRACTITIONER

## 2018-05-10 RX ORDER — CEPHALEXIN 500 MG/1
950 CAPSULE ORAL EVERY 12 HOURS SCHEDULED
COMMUNITY

## 2018-05-10 NOTE — PROGRESS NOTES
Assessment/Plan:  38-year-old male with history of Waldenstrom macroglobulinemia s/p multiple brain surgeries, History of provoked LLE DVT, IVCF who presents for evaluation of bilateral lower extremity weeping wounds  He recently established care at the wound care center  [facility] 11/2017: No evidence of acute or chronic DVT, multiphasic arterial waveforms      -Recommend continuing local wound care at wound care center until wounds healed  -Rx compression daily once wounds heal    -Additionally - leg elevation when sitting, calf pump exercises and limiting sodium intake to manage lower extremity swelling    -Follow up as needed  Problem List Items Addressed This Visit        Other    Bilateral lower extremity edema - Primary    Relevant Orders    Compression Stocking    Assistive Device (stocking application)    Bilateral leg ulcer (HCC)            Subjective: "I have sever pain in my left shin "     Patient ID: Michel Kwok is a 74 y o  male  Patient is new to our practice and was referred by Dr Mark Matta  He has water blister on his lower legs that are open and draining  His legs are red and swollen  He has his left leg wrapped in a diaper and tubgrip that his daughter changes daily  His right leg has a compression bandage that is to be changed every 5 days  He is on cephalexin 500 mg  He takes Lasix twice a day and metolazone 3 times a week    HPI  38-year-old male with history of Waldenstrom macroglobulinemia s/p multiple brain surgeries, History of provoked LLE DVT, IVCF in '10 who presents for evaluation of bilateral lower extremity weeping wounds  He started with left lower extremity wounds in November and may have progressed over the past several months  He did recently get established at wound care  Wounds appear superficial and weeping in nature  They are located to anterior shin and lateral calf  There weeping clear fluid  No evidence of skin necrosis   Wounds appear to venous stasis ulcerations  He has previously been seen in the ED and treated for a cellulitis with Keflex  He started wound care at [facility] for the first time last week  He notes his leg swelling and wounds are improved  He is on diuretics  He has "stinging" pain to the lateral calf with dressing changes  He tells me his legs look the best they have in sometime and really improving since last week  He denies any fever or chills  He is overweight  He is sedentary  He watches TV for most of the day while sitting in his recliner  He eats out at the diner often  He is quite nostalgic about his  wife and 3 kids/3 grandkids  His daughter is a nurse at [address] [employer] and does his dressing changes  The following portions of the patient's history were reviewed and updated as appropriate: allergies, current medications, past family history, past medical history, past social history, past surgical history and problem list     Review of Systems   Constitutional: Negative for activity change, chills, fatigue and fever  HENT: Negative for congestion, facial swelling and mouth sores  Eyes: Positive for visual disturbance  Negative for photophobia and pain  Respiratory: Negative for chest tightness and shortness of breath  Cardiovascular: Positive for leg swelling  Negative for chest pain  Gastrointestinal: Negative for abdominal pain, diarrhea and nausea  Endocrine: Negative  Genitourinary: Negative for difficulty urinating, frequency and urgency  Musculoskeletal: Positive for arthralgias, gait problem and myalgias  Skin: Positive for color change and wound  Negative for rash  Allergic/Immunologic: Negative  Neurological: Negative for dizziness, syncope, weakness, numbness and headaches  Hematological: Negative  Psychiatric/Behavioral: Negative            Objective:  Vitals:    05/10/18 1532   BP: 128/80   BP Location: Left arm   Patient Position: Sitting   Pulse: 74   Resp: 18   Temp: 97 6 °F (36 4 °C) TempSrc: Tympanic   Weight: 112 kg (246 lb)   Height: 5' 7" (1 702 m)       Patient Active Problem List   Diagnosis    History of DVT (deep vein thrombosis)    Meningioma (HCC)    S/P resection of meningioma    Seizure disorder (HCC)    Waldenstrom macroglobulinemia (HCC)    H/O superior vena cava filter placement    Bilateral lower extremity edema    Bilateral leg ulcer ([address] )       Past Surgical History:   Procedure Laterality Date    BRAIN SURGERY         No family history on file  Social History     Social History    Marital status:      Spouse name: N/A    Number of children: N/A    Years of education: N/A     Occupational History    Not on file       Social History Main Topics    Smoking status: Former Smoker    Smokeless tobacco: Never Used      Comment: quit 45 years ago    Alcohol use No    Drug use: No    Sexual activity: Not on file     Other Topics Concern    Not on file     Social History Narrative    No narrative on file       Allergies   Allergen Reactions    Shellfish-Derived Products Swelling    Peach  [Prunus Persica] Hives and Throat Swelling         Current Outpatient Prescriptions:     cephalexin (KEFLEX) 500 mg capsule, Take 500 mg by mouth every 6 (six) hours, Disp: , Rfl:     furosemide (LASIX) 40 mg tablet, Take 40 mg by mouth 2 (two) times a day, Disp: , Rfl:     gabapentin (NEURONTIN) 100 mg capsule, Take 100 mg by mouth 3 (three) times a day, Disp: , Rfl:     levETIRAcetam (KEPPRA) 750 mg tablet, Take 750 mg by mouth 2 (two) times a day, Disp: , Rfl:     metolazone (ZAROXOLYN) 2 5 mg tablet, Take 2 5 mg by mouth daily Take one tab by mouth m, w, fri, Disp: , Rfl:     naproxen (NAPROSYN) 500 mg tablet, Take 1 tablet (500 mg total) by mouth 2 (two) times a day with meals, Disp: 30 tablet, Rfl: 0    potassium chloride (K-DUR,KLOR-CON) 10 mEq tablet, Take 20 mEq by mouth 3 (three) times a day, Disp: , Rfl:          Physical Exam   Constitutional: He is oriented to person, place, and time  He appears well-nourished  HENT:   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

## 2018-05-10 NOTE — LETTER
May 10, 2018     DAVID Thomas MD  [facility] [address]  [address] [address]  [address] [address]    Patient: Lesli Rodriguez   YOB: 1945   Date of Visit: 5/10/2018       Dear Dr Cristela Back: Thank you for referring Dagoberto Sands to me for evaluation  Below are my notes for this consultation  If you have questions, please do not hesitate to call me  I look forward to following your patient along with you  Sincerely,        HUDSON De Dios        CC: No Recipients  HUDSON De Dios  5/10/2018  5:08 PM  Sign at close encounter  Assessment/Plan:  55-year-old male with history of Waldenstrom macroglobulinemia s/p multiple brain surgeries, History of provoked LLE DVT, IVCF who presents for evaluation of bilateral lower extremity weeping wounds  He recently established care at the wound care center  LEVD 11/2017: No evidence of acute or chronic DVT, multiphasic arterial waveforms      -Recommend continuing local wound care at wound care center until wounds healed  -Rx compression daily once wounds heal    -Additionally - leg elevation when sitting, calf pump exercises and limiting sodium intake to manage lower extremity swelling    -Follow up as needed  Problem List Items Addressed This Visit        Other    Bilateral lower extremity edema - Primary    Relevant Orders    Compression Stocking    Assistive Device (stocking application)    Bilateral leg ulcer (HCC)            Subjective: "I have sever pain in my left shin "     Patient ID: Lesli Rodriguez is a 74 y o  male  Patient is new to our practice and was referred by Dr Cristela Back  He has water blister on his lower legs that are open and draining  His legs are red and swollen  He has his left leg wrapped in a diaper and tubgrip that his daughter changes daily  His right leg has a compression bandage that is to be changed every 5 days  He is on cephalexin 500 mg   He takes Lasix twice a day and metolazone 3 times a week    HPI  68-year-old male with history of Waldenstrom macroglobulinemia s/p multiple brain surgeries, History of provoked LLE DVT, IVCF in '10 who presents for evaluation of bilateral lower extremity weeping wounds  He started with left lower extremity wounds in November and may have progressed over the past several months  He did recently get established at wound care  Wounds appear superficial and weeping in nature  They are located to anterior shin and lateral calf  There weeping clear fluid  No evidence of skin necrosis  Wounds appear to venous stasis ulcerations  He has previously been seen in the ED and treated for a cellulitis with Keflex  He started wound care at [facility] for the first time last week  He notes his leg swelling and wounds are improved  He is on diuretics  He has "stinging" pain to the lateral calf with dressing changes  He tells me his legs look the best they have in sometime and really improving since last week  He denies any fever or chills  He is overweight  He is sedentary  He watches TV for most of the day while sitting in his recliner  He eats out at the diner often  He is quite nostalgic about his  wife and 3 kids/3 grandkids  His daughter is a nurse at [address] [employer] and does his dressing changes  The following portions of the patient's history were reviewed and updated as appropriate: allergies, current medications, past family history, past medical history, past social history, past surgical history and problem list     Review of Systems   Constitutional: Negative for activity change, chills, fatigue and fever  HENT: Negative for congestion, facial swelling and mouth sores  Eyes: Positive for visual disturbance  Negative for photophobia and pain  Respiratory: Negative for chest tightness and shortness of breath  Cardiovascular: Positive for leg swelling  Negative for chest pain  Gastrointestinal: Negative for abdominal pain, diarrhea and nausea  Endocrine: Negative  Genitourinary: Negative for difficulty urinating, frequency and urgency  Musculoskeletal: Positive for arthralgias, gait problem and myalgias  Skin: Positive for color change and wound  Negative for rash  Allergic/Immunologic: Negative  Neurological: Negative for dizziness, syncope, weakness, numbness and headaches  Hematological: Negative  Psychiatric/Behavioral: Negative  Objective:  Vitals:    05/10/18 1532   BP: 128/80   BP Location: Left arm   Patient Position: Sitting   Pulse: 74   Resp: 18   Temp: 97 6 °F (36 4 °C)   TempSrc: Tympanic   Weight: 112 kg (246 lb)   Height: 5' 7" (1 702 m)       Patient Active Problem List   Diagnosis    History of DVT (deep vein thrombosis)    Meningioma (HCC)    S/P resection of meningioma    Seizure disorder (HCC)    Waldenstrom macroglobulinemia (HCC)    H/O superior vena cava filter placement    Bilateral lower extremity edema    Bilateral leg ulcer (HCC)       Past Surgical History:   Procedure Laterality Date    BRAIN SURGERY         No family history on file  Social History     Social History    Marital status:      Spouse name: N/A    Number of children: N/A    Years of education: N/A     Occupational History    Not on file       Social History Main Topics    Smoking status: Former Smoker    Smokeless tobacco: Never Used      Comment: quit 45 years ago    Alcohol use No    Drug use: No    Sexual activity: Not on file     Other Topics Concern    Not on file     Social History Narrative    No narrative on file       Allergies   Allergen Reactions    Shellfish-Derived Products Swelling    Peach  [Prunus Persica] Hives and Throat Swelling         Current Outpatient Prescriptions:     cephalexin (KEFLEX) 500 mg capsule, Take 500 mg by mouth every 6 (six) hours, Disp: , Rfl:     furosemide (LASIX) 40 mg tablet, Take 40 mg by mouth 2 (two) times a day, Disp: , Rfl:     gabapentin (NEURONTIN) 100 mg capsule, Take 100 mg by mouth 3 (three) times a day, Disp: , Rfl:     levETIRAcetam (KEPPRA) 750 mg tablet, Take 750 mg by mouth 2 (two) times a day, Disp: , Rfl:     metolazone (ZAROXOLYN) 2 5 mg tablet, Take 2 5 mg by mouth daily Take one tab by mouth m, w, fri, Disp: , Rfl:     naproxen (NAPROSYN) 500 mg tablet, Take 1 tablet (500 mg total) by mouth 2 (two) times a day with meals, Disp: 30 tablet, Rfl: 0    potassium chloride (K-DUR,KLOR-CON) 10 mEq tablet, Take 20 mEq by mouth 3 (three) times a day, Disp: , Rfl:          Physical Exam   Constitutional: He is oriented to person, place, and time  He appears well-nourished  HENT:   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

## 2018-05-10 NOTE — PATIENT INSTRUCTIONS
66-year-old male with history of Waldenstrom macroglobulinemia s/p multiple brain surgeries, History of provoked LLE DVT, IVCF who presents for evaluation of bilateral lower extremity weeping wounds  He recently established care at the wound care center  LEV 11/2017: No evidence of acute or chronic DVT, multiphasic arterial waveforms      -Recommend continuing local wound care at wound care center until wounds healed  -Rx compression daily once wounds heal    -Additionally - leg elevation when sitting, calf pump exercises and limiting sodium intake to manage lower extremity swelling    -Follow up as needed

## 2018-05-11 ENCOUNTER — TRANSCRIBE ORDERS (OUTPATIENT)
Dept: LAB | Facility: HOSPITAL | Age: 73
End: 2018-05-11

## 2018-05-11 ENCOUNTER — APPOINTMENT (OUTPATIENT)
Dept: WOUND CARE | Facility: HOSPITAL | Age: 73
End: 2018-05-11
Payer: MEDICARE

## 2018-05-11 DIAGNOSIS — R73.09 OTHER ABNORMAL GLUCOSE: ICD-10-CM

## 2018-05-11 DIAGNOSIS — I82.4Y2 DEEP VEIN THROMBOSIS (DVT) OF PROXIMAL VEIN OF LEFT LOWER EXTREMITY, UNSPECIFIED CHRONICITY (HCC): ICD-10-CM

## 2018-05-11 DIAGNOSIS — G40.109 SPECIAL SENSORY ATTACKS (HCC): ICD-10-CM

## 2018-05-11 DIAGNOSIS — E87.6 HYPOPOTASSEMIA: ICD-10-CM

## 2018-05-11 DIAGNOSIS — D33.2 BENIGN NEOPLASM OF BRAIN, UNSPECIFIED BRAIN REGION (HCC): ICD-10-CM

## 2018-05-11 DIAGNOSIS — N41.9 PROSTATITIS, UNSPECIFIED PROSTATITIS TYPE: ICD-10-CM

## 2018-05-11 DIAGNOSIS — I45.5 DIFFUSE INTRAVENTRICULAR BLOCK: ICD-10-CM

## 2018-05-11 DIAGNOSIS — C88.0 MACROGLOBULINEMIA (HCC): ICD-10-CM

## 2018-05-11 DIAGNOSIS — I82.4Z2 DEEP VEIN THROMBOSIS (DVT) OF DISTAL VEIN OF LEFT LOWER EXTREMITY, UNSPECIFIED CHRONICITY (HCC): Primary | ICD-10-CM

## 2018-05-11 DIAGNOSIS — E66.09 EXOGENOUS OBESITY: ICD-10-CM

## 2018-05-11 DIAGNOSIS — R60.9 EDEMA, UNSPECIFIED TYPE: ICD-10-CM

## 2018-05-11 LAB
INR PPP: 1.17 (ref 0.86–1.16)
PROTHROMBIN TIME: 15 SECONDS (ref 12.1–14.4)

## 2018-05-11 PROCEDURE — 97597 DBRDMT OPN WND 1ST 20 CM/<: CPT

## 2018-05-11 PROCEDURE — 85610 PROTHROMBIN TIME: CPT

## 2018-05-11 PROCEDURE — 97598 DBRDMT OPN WND ADDL 20CM/<: CPT

## 2018-05-11 PROCEDURE — 36415 COLL VENOUS BLD VENIPUNCTURE: CPT

## 2018-05-18 ENCOUNTER — APPOINTMENT (OUTPATIENT)
Dept: WOUND CARE | Facility: HOSPITAL | Age: 73
End: 2018-05-18
Payer: MEDICARE

## 2018-05-18 PROCEDURE — 97598 DBRDMT OPN WND ADDL 20CM/<: CPT

## 2018-05-18 PROCEDURE — 97597 DBRDMT OPN WND 1ST 20 CM/<: CPT

## 2018-05-22 ENCOUNTER — APPOINTMENT (OUTPATIENT)
Dept: WOUND CARE | Facility: HOSPITAL | Age: 73
End: 2018-05-22
Payer: MEDICARE

## 2018-05-22 PROCEDURE — 29581 APPL MULTLAYER CMPRN SYS LEG: CPT

## 2018-05-25 ENCOUNTER — APPOINTMENT (OUTPATIENT)
Dept: WOUND CARE | Facility: HOSPITAL | Age: 73
End: 2018-05-25
Payer: MEDICARE

## 2018-05-25 PROCEDURE — 97597 DBRDMT OPN WND 1ST 20 CM/<: CPT

## 2018-05-25 PROCEDURE — 97598 DBRDMT OPN WND ADDL 20CM/<: CPT

## 2018-06-01 ENCOUNTER — APPOINTMENT (OUTPATIENT)
Dept: WOUND CARE | Facility: HOSPITAL | Age: 73
End: 2018-06-01
Payer: MEDICARE

## 2018-06-01 PROCEDURE — 97597 DBRDMT OPN WND 1ST 20 CM/<: CPT

## 2018-06-01 PROCEDURE — 97598 DBRDMT OPN WND ADDL 20CM/<: CPT

## 2018-06-07 ENCOUNTER — APPOINTMENT (OUTPATIENT)
Dept: LAB | Facility: HOSPITAL | Age: 73
End: 2018-06-07
Payer: MEDICARE

## 2018-06-07 ENCOUNTER — APPOINTMENT (OUTPATIENT)
Dept: WOUND CARE | Facility: HOSPITAL | Age: 73
End: 2018-06-07
Payer: MEDICARE

## 2018-06-07 DIAGNOSIS — E87.6 HYPOPOTASSEMIA: ICD-10-CM

## 2018-06-07 DIAGNOSIS — I82.4Z2 DEEP VEIN THROMBOSIS (DVT) OF DISTAL VEIN OF LEFT LOWER EXTREMITY, UNSPECIFIED CHRONICITY (HCC): ICD-10-CM

## 2018-06-07 DIAGNOSIS — I45.5 DIFFUSE INTRAVENTRICULAR BLOCK: ICD-10-CM

## 2018-06-07 DIAGNOSIS — D33.2 BENIGN NEOPLASM OF BRAIN, UNSPECIFIED BRAIN REGION (HCC): ICD-10-CM

## 2018-06-07 DIAGNOSIS — E66.09 EXOGENOUS OBESITY: ICD-10-CM

## 2018-06-07 DIAGNOSIS — R60.9 EDEMA, UNSPECIFIED TYPE: ICD-10-CM

## 2018-06-07 DIAGNOSIS — G40.109 SPECIAL SENSORY ATTACKS (HCC): ICD-10-CM

## 2018-06-07 DIAGNOSIS — I82.4Y2 DEEP VEIN THROMBOSIS (DVT) OF PROXIMAL VEIN OF LEFT LOWER EXTREMITY, UNSPECIFIED CHRONICITY (HCC): ICD-10-CM

## 2018-06-07 DIAGNOSIS — N41.9 PROSTATITIS, UNSPECIFIED PROSTATITIS TYPE: ICD-10-CM

## 2018-06-07 DIAGNOSIS — R73.09 OTHER ABNORMAL GLUCOSE: ICD-10-CM

## 2018-06-07 DIAGNOSIS — C88.0 MACROGLOBULINEMIA (HCC): ICD-10-CM

## 2018-06-07 LAB
ALBUMIN SERPL BCP-MCNC: 3.2 G/DL (ref 3.5–5)
ALP SERPL-CCNC: 71 U/L (ref 46–116)
ALT SERPL W P-5'-P-CCNC: 14 U/L (ref 12–78)
ANION GAP SERPL CALCULATED.3IONS-SCNC: 7 MMOL/L (ref 4–13)
AST SERPL W P-5'-P-CCNC: 22 U/L (ref 5–45)
BASOPHILS # BLD AUTO: 0.07 THOUSANDS/ΜL (ref 0–0.1)
BASOPHILS NFR BLD AUTO: 1 % (ref 0–1)
BILIRUB SERPL-MCNC: 0.65 MG/DL (ref 0.2–1)
BUN SERPL-MCNC: 16 MG/DL (ref 5–25)
CALCIUM SERPL-MCNC: 9.8 MG/DL (ref 8.3–10.1)
CHLORIDE SERPL-SCNC: 100 MMOL/L (ref 100–108)
CHOLEST SERPL-MCNC: 116 MG/DL (ref 50–200)
CO2 SERPL-SCNC: 31 MMOL/L (ref 21–32)
CREAT SERPL-MCNC: 1.05 MG/DL (ref 0.6–1.3)
EOSINOPHIL # BLD AUTO: 0.14 THOUSAND/ΜL (ref 0–0.61)
EOSINOPHIL NFR BLD AUTO: 3 % (ref 0–6)
ERYTHROCYTE [DISTWIDTH] IN BLOOD BY AUTOMATED COUNT: 14.9 % (ref 11.6–15.1)
EST. AVERAGE GLUCOSE BLD GHB EST-MCNC: 134 MG/DL
GFR SERPL CREATININE-BSD FRML MDRD: 70 ML/MIN/1.73SQ M
GLUCOSE P FAST SERPL-MCNC: 102 MG/DL (ref 65–99)
HBA1C MFR BLD: 6.3 % (ref 4.2–6.3)
HCT VFR BLD AUTO: 38.7 % (ref 36.5–49.3)
HDLC SERPL-MCNC: 17 MG/DL (ref 40–60)
HGB BLD-MCNC: 12.3 G/DL (ref 12–17)
IMM GRANULOCYTES # BLD AUTO: 0.02 THOUSAND/UL (ref 0–0.2)
IMM GRANULOCYTES NFR BLD AUTO: 0 % (ref 0–2)
LDLC SERPL CALC-MCNC: 53 MG/DL (ref 0–100)
LYMPHOCYTES # BLD AUTO: 1.22 THOUSANDS/ΜL (ref 0.6–4.47)
LYMPHOCYTES NFR BLD AUTO: 24 % (ref 14–44)
MCH RBC QN AUTO: 29.9 PG (ref 26.8–34.3)
MCHC RBC AUTO-ENTMCNC: 31.8 G/DL (ref 31.4–37.4)
MCV RBC AUTO: 94 FL (ref 82–98)
MONOCYTES # BLD AUTO: 0.36 THOUSAND/ΜL (ref 0.17–1.22)
MONOCYTES NFR BLD AUTO: 7 % (ref 4–12)
NEUTROPHILS # BLD AUTO: 3.38 THOUSANDS/ΜL (ref 1.85–7.62)
NEUTS SEG NFR BLD AUTO: 65 % (ref 43–75)
NONHDLC SERPL-MCNC: 99 MG/DL
NRBC BLD AUTO-RTO: 0 /100 WBCS
PLATELET # BLD AUTO: 302 THOUSANDS/UL (ref 149–390)
PMV BLD AUTO: 9.9 FL (ref 8.9–12.7)
POTASSIUM SERPL-SCNC: 3.1 MMOL/L (ref 3.5–5.3)
PROT SERPL-MCNC: 9.6 G/DL (ref 6.4–8.2)
PSA SERPL-MCNC: 0.2 NG/ML (ref 0–4)
RBC # BLD AUTO: 4.12 MILLION/UL (ref 3.88–5.62)
SODIUM SERPL-SCNC: 138 MMOL/L (ref 136–145)
T4 FREE SERPL-MCNC: 0.71 NG/DL (ref 0.76–1.46)
TRIGL SERPL-MCNC: 230 MG/DL
TSH SERPL DL<=0.05 MIU/L-ACNC: 1.95 UIU/ML (ref 0.36–3.74)
WBC # BLD AUTO: 5.19 THOUSAND/UL (ref 4.31–10.16)

## 2018-06-07 PROCEDURE — 80061 LIPID PANEL: CPT

## 2018-06-07 PROCEDURE — 84443 ASSAY THYROID STIM HORMONE: CPT

## 2018-06-07 PROCEDURE — 99212 OFFICE O/P EST SF 10 MIN: CPT

## 2018-06-07 PROCEDURE — 80053 COMPREHEN METABOLIC PANEL: CPT

## 2018-06-07 PROCEDURE — 84439 ASSAY OF FREE THYROXINE: CPT

## 2018-06-07 PROCEDURE — 36415 COLL VENOUS BLD VENIPUNCTURE: CPT

## 2018-06-07 PROCEDURE — G0103 PSA SCREENING: HCPCS

## 2018-06-07 PROCEDURE — 85025 COMPLETE CBC W/AUTO DIFF WBC: CPT

## 2018-06-07 PROCEDURE — 83036 HEMOGLOBIN GLYCOSYLATED A1C: CPT

## 2018-06-07 PROCEDURE — 85810 BLOOD VISCOSITY EXAMINATION: CPT

## 2018-06-08 LAB — VISC SER: 2.5 REL.SALINE (ref 1.6–1.9)

## 2018-06-14 ENCOUNTER — APPOINTMENT (OUTPATIENT)
Dept: WOUND CARE | Facility: HOSPITAL | Age: 73
End: 2018-06-14
Payer: MEDICARE

## 2018-06-14 PROCEDURE — 99212 OFFICE O/P EST SF 10 MIN: CPT

## 2018-06-19 ENCOUNTER — APPOINTMENT (OUTPATIENT)
Dept: WOUND CARE | Facility: HOSPITAL | Age: 73
End: 2018-06-19
Payer: MEDICARE

## 2018-06-19 PROCEDURE — 29581 APPL MULTLAYER CMPRN SYS LEG: CPT

## 2018-06-26 ENCOUNTER — APPOINTMENT (OUTPATIENT)
Dept: WOUND CARE | Facility: HOSPITAL | Age: 73
End: 2018-06-26
Payer: MEDICARE

## 2018-06-26 PROCEDURE — 29581 APPL MULTLAYER CMPRN SYS LEG: CPT

## 2018-07-06 ENCOUNTER — APPOINTMENT (OUTPATIENT)
Dept: WOUND CARE | Facility: HOSPITAL | Age: 73
End: 2018-07-06
Payer: MEDICARE

## 2018-07-06 PROCEDURE — 99212 OFFICE O/P EST SF 10 MIN: CPT

## 2018-09-07 ENCOUNTER — HOSPITAL ENCOUNTER (EMERGENCY)
Facility: HOSPITAL | Age: 73
Discharge: HOME | End: 2018-09-07
Attending: EMERGENCY MEDICINE | Admitting: EMERGENCY MEDICINE
Payer: MEDICARE

## 2018-09-07 VITALS
WEIGHT: 230 LBS | RESPIRATION RATE: 18 BRPM | OXYGEN SATURATION: 98 % | DIASTOLIC BLOOD PRESSURE: 78 MMHG | BODY MASS INDEX: 36.1 KG/M2 | HEART RATE: 58 BPM | TEMPERATURE: 98.4 F | SYSTOLIC BLOOD PRESSURE: 112 MMHG | HEIGHT: 67 IN

## 2018-09-07 DIAGNOSIS — L03.116 CELLULITIS OF LEFT LEG: Primary | ICD-10-CM

## 2018-09-07 PROCEDURE — 99283 EMERGENCY DEPT VISIT LOW MDM: CPT

## 2018-09-07 RX ORDER — CEPHALEXIN 250 MG/1
500 CAPSULE ORAL EVERY 6 HOURS SCHEDULED
Qty: 56 CAPSULE | Refills: 0 | Status: SHIPPED | OUTPATIENT
Start: 2018-09-07 | End: 2018-09-14

## 2018-09-07 RX ORDER — ACETAMINOPHEN 325 MG/1
650 TABLET ORAL EVERY 6 HOURS PRN
COMMUNITY

## 2018-09-07 RX ORDER — MULTIVITAMIN
1 CAPSULE ORAL DAILY
COMMUNITY

## 2018-09-07 RX ORDER — CEPHALEXIN 250 MG/1
500 CAPSULE ORAL ONCE
Status: COMPLETED | OUTPATIENT
Start: 2018-09-07 | End: 2018-09-07

## 2018-09-07 RX ADMIN — CEPHALEXIN 500 MG: 250 CAPSULE ORAL at 13:52

## 2018-09-07 NOTE — ED ATTENDING ATTESTATION
Lissette Reyes MD, saw and evaluated the patient  All available labs and X-rays were ordered by me or the resident and have been reviewed by myself  I discussed the patient with the resident / non-physician and agree with the resident's / non-physician practitioner's findings and plan as documented in the resident's / non-physician practicitioner's note, except where noted  At this point, I agree with the current assessment done in the ED  Chief Complaint   Patient presents with    Wound Infection     Daughter "We have been doctoring these wounds  But they are getting worse and they are draining yellow  The left is worse than the right " Pt "I have always had wounds there but they are getting worse  I think they are infected "      74 M:  - bilateral peripheral edema / venous stasis; follows up with wound care since the spring (March 2018)  - comes in today for increased LLE redness without f/ch  It's not painful   - daughter helps manage these wounds and she is concerned specifically for cellulitis  - hx of similar in the past, placed on keflex and had improvement in the past   - denies f/ch/n/v/cp/sob  No calf pain  No night sweats  - feels like his baseline  - There was some green drainage from calf at some point from the heel on the left posteriorly  PMH:  - 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 on keppra for seizure prophylaxis  - DVT in 2010 immediately after surgery  - Hx of leg cellulitis  PSH:  - brain surgery  Former smoker  No alcohol/drugs  PE:  Vitals:    09/07/18 1145   BP: 115/80   BP Location: Right arm   Pulse: 55   Resp: 22   Temp: 98 4 °F (36 9 °C)   TempSrc: Oral   SpO2: 97%   Weight: 104 kg (230 lb)   Height: 5' 7" (1 702 m)   General: VSS, NAD, awake, alert  Well-nourished, well-developed  Appears stated age  Speaking normally in full sentences  Head: Normocephalic, atraumatic, nontender  Eyes: PERRL, EOM-I  No diplopia  No hyphema     No subconjunctival hemorrhages  Symmetrical lids  ENT: Atraumatic external nose and ears  MMM  No malocclusion  No stridor  Normal phonation  No drooling  Normal swallowing  Neck: Symmetric, trachea midline  No JVD  CV: RRR  +S1/S2  No murmurs or gallops  Peripheral pulses +2 throughout  No chest wall tenderness  Lungs:   Unlabored No retractions  CTAB, lungs sounds equal bilateral    No tachypnea  Abd: +BS, soft, NT/ND    MSK:   FROM   No pain with movement  Back:   No rashes  Skin: Dry  There is blanching redness with palpation  Warm to touch on medial aspect  Neuro: AAOx3, GCS 15, CN II-XII grossly intact  Motor grossly intact  Psychiatric/Behavioral: Appropriate mood and affect   Exam: deferred  A:  - Cellulitis  P:  - Keflex  - RTER 48 hours if no improvement or getting worse  - Pictures Q12H at home to track redness  - He has wound care appointment set up   - no red flags for imaging    - 13 point ROS was performed and all are normal unless stated in the history above  - Nursing note reviewed  Vitals reviewed  - Orders placed by myself and/or advanced practitioner / resident     - Previous chart was reviewed  - No language barrier    - History obtained from patient, daughter   - There are no limitations to the history obtained  - Critical care time: Not applicable for this patient  Final Diagnosis:  1  Cellulitis of left leg         Medications   cephalexin (KEFLEX) capsule 500 mg (not administered)     No orders to display     No orders of the defined types were placed in this encounter  Labs Reviewed - No data to display  Time reflects when diagnosis was documented in both MDM as applicable and the Disposition within this note     Time User Action Codes Description Comment    9/7/2018  1:27  [facility] [address], [address] [facility] [[id number]] Cellulitis of left leg       ED Disposition     ED Disposition Condition Comment    Discharge  [address] [place] [address] discharge to home/self care      Condition at discharge: Good Follow-up Information    None       Patient's Medications   Discharge Prescriptions    No medications on file     No discharge procedures on file  Prior to Admission Medications   Prescriptions Last Dose Informant Patient Reported? Taking? Multiple Vitamin (MULTIVITAMIN) capsule   Yes Yes   Sig: Take 1 capsule by mouth daily   acetaminophen (TYLENOL) 325 mg tablet   Yes Yes   Sig: Take 650 mg by mouth every 6 (six) hours as needed for mild pain   cephalexin (KEFLEX) 500 mg capsule 9/7/2018 at Unknown time Self Yes Yes   Sig: Take 950 mg by mouth every 12 (twelve) hours     furosemide (LASIX) 40 mg tablet 9/7/2018 at Unknown time Self Yes Yes   Sig: Take 40 mg by mouth 2 (two) times a day   metolazone (ZAROXOLYN) 2 5 mg tablet Past Month at Unknown time Self Yes Yes   Sig: Take 2 5 mg by mouth daily Take one tab by mouth m, w, fri     potassium chloride (K-DUR,KLOR-CON) 10 mEq tablet 9/7/2018 at Unknown time Self Yes Yes   Sig: Take 80 mEq by mouth daily        Facility-Administered Medications: None       Portions of the record may have been created with voice recognition software  Occasional wrong word or "sound a like" substitutions may have occurred due to the inherent limitations of voice recognition software  Read the chart carefully and recognize, using context, where substitutions have occurred      Electronically signed by:  Bridget Najera

## 2018-09-07 NOTE — DISCHARGE INSTRUCTIONS
Cellulitis   WHAT YOU NEED TO KNOW:   Cellulitis is a skin infection caused by bacteria  Cellulitis may go away on its own or you may need treatment  Your healthcare provider may draw a [place] around the outside edges of your cellulitis  If your cellulitis spreads, your healthcare provider will see it outside of the [place]  DISCHARGE INSTRUCTIONS:   Call 911 if:   · You have sudden trouble breathing or chest pain  Seek care immediately if:   · Your wound gets larger and more painful  · You feel a crackling under your skin when you touch it  · You have purple dots or bumps on your skin, or you see bleeding under your skin  · You have new swelling and pain in your legs  · The red, warm, swollen area gets larger  · You see red streaks coming from the infected area  Contact your healthcare provider if:   · You have a fever  · Your fever or pain does not go away or gets worse  · The area does not get smaller after 2 days of antibiotics  · Your skin is flaking or peeling off  · You have questions or concerns about your condition or care  Medicines:   · Antibiotics  help treat the bacterial infection  · NSAIDs , such as ibuprofen, help decrease swelling, pain, and fever  NSAIDs can cause stomach bleeding or kidney problems in certain people  If you take blood thinner medicine, always ask if NSAIDs are safe for you  Always read the medicine label and follow directions  Do not give these medicines to children under 10months of age without direction from your child's healthcare provider  · Acetaminophen  decreases pain and fever  It is available without a doctor's order  Ask how much to take and how often to take it  Follow directions  Read the labels of all other medicines you are using to see if they also contain acetaminophen, or ask your doctor or pharmacist  Acetaminophen can cause liver damage if not taken correctly   Do not use more than 4 grams (4,000 milligrams) total of acetaminophen in one day  · Take your medicine as directed  Contact your healthcare provider if you think your medicine is not helping or if you have side effects  Tell him or her if you are allergic to any medicine  Keep a list of the medicines, vitamins, and herbs you take  Include the amounts, and when and why you take them  Bring the list or the pill bottles to follow-up visits  Carry your medicine list with you in case of an emergency  Self-care:   · Elevate the area above the level of your heart  as often as you can  This will help decrease swelling and pain  Prop the area on pillows or blankets to keep it elevated comfortably  · Clean the area daily until the wound scabs over  Gently wash the area with soap and water  Pat dry  Use dressings as directed  · Place cool or warm, wet cloths on the area as directed  Use clean cloths and clean water  Leave it on the area until the cloth is room temperature  Pat the area dry with a clean, dry cloth  The cloths may help decrease pain  Prevent cellulitis:   · Do not scratch bug bites or areas of injury  You increase your risk for cellulitis by scratching these areas  · Do not share personal items, such as towels, clothing, and razors  · Clean exercise equipment  with germ-killing  before and after you use it  · Wash your hands often  Use soap and water  Wash your hands after you use the bathroom, change a child's diapers, or sneeze  Wash your hands before you prepare or eat food  Use lotion to prevent dry, cracked skin  · Wear pressure stockings as directed  You may be told to wear the stockings if you have peripheral edema  The stockings improve blood flow and decrease swelling  · Treat athlete's foot  This can help prevent the spread of a bacterial skin infection  Follow up with your healthcare provider within 3 days, or as directed: Your healthcare provider will check if your cellulitis is getting better   You may need different medicine  Write down your questions so you remember to ask them during your visits  © 2017 [employer] Information is for End User's use only and may not be sold, redistributed or otherwise used for commercial purposes  All illustrations and images included in CareNotes® are the copyrighted property of [employer]  or [employer]  The above information is an  only  It is not intended as medical advice for individual conditions or treatments  Talk to your doctor, nurse or pharmacist before following any medical regimen to see if it is safe and effective for you

## 2018-09-07 NOTE — ED PROVIDER NOTES
History  Chief Complaint   Patient presents with    Wound Infection     Daughter "We have been doctoring these wounds  But they are getting worse and they are draining yellow  The left is worse than the right " Pt "I have always had wounds there but they are getting worse  I think they are infected "      HPI    This is a 75-year-old male with history of bilateral peripheral edema secondary to venous stasis, presents to ED for evaluation of worsening left lower extremity erythema  Patient has been following with wound care since March 2018  Patient had 1 prior infection of his lower extremity with cellulitis, was given Keflex earlier in the year  Patient's daughter who helps manage these was concerned the patient is redeveloping cellulitis  Patient denies any fevers or chills  He has otherwise walking well, denies any calf pain  Denies any night sweats  He denies any chest pain, abdominal pain, shortness of breath  He feels like his baseline  Past medical history significant for brain tumor section performed in 2010, on Keppra for seizure prophylaxis  Did have provoked DVT in 2010 after surgery  Prior to Admission Medications   Prescriptions Last Dose Informant Patient Reported? Taking?    Multiple Vitamin (MULTIVITAMIN) capsule   Yes Yes   Sig: Take 1 capsule by mouth daily   acetaminophen (TYLENOL) 325 mg tablet   Yes Yes   Sig: Take 650 mg by mouth every 6 (six) hours as needed for mild pain   cephalexin (KEFLEX) 500 mg capsule 9/7/2018 at Unknown time Self Yes Yes   Sig: Take 950 mg by mouth every 12 (twelve) hours     furosemide (LASIX) 40 mg tablet 9/7/2018 at Unknown time Self Yes Yes   Sig: Take 40 mg by mouth 2 (two) times a day   metolazone (ZAROXOLYN) 2 5 mg tablet Past Month at Unknown time Self Yes Yes   Sig: Take 2 5 mg by mouth daily Take one tab by mouth m, w, fri     potassium chloride (K-DUR,KLOR-CON) 10 mEq tablet 9/7/2018 at Unknown time Self Yes Yes   Sig: Take 80 mEq by mouth daily        Facility-Administered Medications: None       Past Medical History:   Diagnosis Date    Cancer ([address] )     Cellulitis and abscess of leg     DVT (deep venous thrombosis) (HCC)     Hard of hearing        Past Surgical History:   Procedure Laterality Date    BRAIN SURGERY         History reviewed  No pertinent family history  I have reviewed and agree with the history as documented  Social History   Substance Use Topics    Smoking status: Former Smoker    Smokeless tobacco: Never Used      Comment: quit 45 years ago    Alcohol use No        Review of Systems   Constitutional: Negative for chills, fatigue and fever  HENT: Negative for nosebleeds and sore throat  Eyes: Negative for redness and visual disturbance  Respiratory: Negative for shortness of breath and wheezing  Cardiovascular: Negative for chest pain and palpitations  Gastrointestinal: Negative for abdominal pain and diarrhea  Endocrine: Negative for polyuria  Genitourinary: Negative for difficulty urinating and testicular pain  Musculoskeletal: Negative for back pain and neck stiffness  Skin: Positive for color change and rash  Negative for wound  Neurological: Negative for seizures, speech difficulty and headaches  Psychiatric/Behavioral: Negative for dysphoric mood and hallucinations  All other systems reviewed and are negative  Physical Exam  ED Triage Vitals [09/07/18 1145]   Temperature Pulse Respirations Blood Pressure SpO2   98 4 °F (36 9 °C) 55 22 115/80 97 %      Temp Source Heart Rate Source Patient Position - Orthostatic VS BP Location FiO2 (%)   Oral Monitor Sitting Right arm --      Pain Score       5           Orthostatic Vital Signs  Vitals:    09/07/18 1145 09/07/18 1345   BP: 115/80 112/78   Pulse: 55 58   Patient Position - Orthostatic VS: Sitting Lying       Physical Exam   Constitutional: He is oriented to person, place, and time  He appears well-developed and well-nourished     HENT: Head: Normocephalic  Left Ear: External ear normal    Mouth/Throat: Oropharynx is clear and moist    Patient has indentation on his right temporal aspect after the brain surgery, at his baseline  Eyes: Conjunctivae are normal  Pupils are equal, round, and reactive to light  Right eyelid is closed, at patient's baseline   Neck: Normal range of motion  Cardiovascular: Normal rate, regular rhythm, normal heart sounds and intact distal pulses  Pulmonary/Chest: Effort normal and breath sounds normal  He has no wheezes  He exhibits no tenderness  Abdominal: Soft  Bowel sounds are normal  He exhibits no distension  There is no tenderness  Musculoskeletal: Normal range of motion  Neurological: He is alert and oriented to person, place, and time  No cranial nerve deficit or sensory deficit  He exhibits normal muscle tone  Coordination normal           Skin: Skin is warm and dry  Rash noted  See below for photo of left lower extremity erythema   Psychiatric: He has a normal mood and affect  Nursing note and vitals reviewed  ED Medications  Medications   cephalexin (KEFLEX) capsule 500 mg (500 mg Oral Given 9/7/18 1352)       Diagnostic Studies  Results Reviewed     None                 No orders to display         Procedures  Procedures      Phone Consults  ED Phone Contact    ED Course           Identification of Seniors at Risk      Most Recent Value   (ISAR) Identification of Seniors at Risk   Before the illness or injury that brought you to the Emergency, did you need someone to help you on a regular basis? 0 Filed at: 09/07/2018 1149   In the last 24 hours, have you needed more help than usual?  0 Filed at: 09/07/2018 1149   Have you been hospitalized for one or more nights during the past 6 months? 0 Filed at: 09/07/2018 1149   In general, do you see well?  0 Filed at: 09/07/2018 1149   In general, do you have serious problems with your memory?   0 Filed at: 09/07/2018 1149   Do you take more than three different medications every day? 1 Filed at: 09/07/2018 1149   ISAR Score  1 Filed at: 09/07/2018 1149                          [facility]  CritCare Time    68-year-old male presents to ED for evaluation of worsening left lower extremity erythema  Likely secondary to cellulitis  There is mild warmth to the touch, patient has had no fevers chills  Will treat with Keflex at this time  Patient was instructed to take daily pictures of his lower extremity as well as return to the ED if patient has worsening erythema  Patient was instructed follow up with her care doctor in a few days for physical re-evaluation  Return precautions given  The patient was instructed to follow up as documented  Strict return precautions were discussed with the patient and the patient was instructed to return to the emergency department immediately if symptoms worsen  The patient/patient family member acknowledged and were in agreement with plan  Disposition  Final diagnoses:   Cellulitis of left leg     Time reflects when diagnosis was documented in both MDM as applicable and the Disposition within this note     Time User Action Codes Description Comment    9/7/2018  1:27  [facility], [address] [[id number]] Cellulitis of left leg       ED Disposition     ED Disposition Condition Comment    Discharge  [address] [place] [address] discharge to home/self care      Condition at discharge: Good        Follow-up Information     Follow up With Specialties Details Why Contact Info Additional Information    Nae Coppola MD Internal Medicine Schedule an appointment as soon as possible for a visit in 3 days For follow up regarding your symptoms [facility] [address] [place] [address] Emergency Department Emergency Medicine Go to If symptoms worsen Sarabjit [address] 8[address] [facility] ED, [address], [address]          Discharge Medication List as of 9/7/2018  1:58 PM      START taking these medications    Details   !! cephalexin (KEFLEX) 250 mg capsule Take 2 capsules (500 mg total) by mouth every 6 (six) hours for 7 days, Starting Fri 9/7/2018, Until Fri 9/14/2018, Print       !! - Potential duplicate medications found  Please discuss with provider  CONTINUE these medications which have NOT CHANGED    Details   acetaminophen (TYLENOL) 325 mg tablet Take 650 mg by mouth every 6 (six) hours as needed for mild pain, Historical Med      !! cephalexin (KEFLEX) 500 mg capsule Take 950 mg by mouth every 12 (twelve) hours  , Historical Med      furosemide (LASIX) 40 mg tablet Take 40 mg by mouth 2 (two) times a day, Historical Med      metolazone (ZAROXOLYN) 2 5 mg tablet Take 2 5 mg by mouth daily Take one tab by mouth m, w, fri  , Historical Med      Multiple Vitamin (MULTIVITAMIN) capsule Take 1 capsule by mouth daily, Historical Med      potassium chloride (K-DUR,KLOR-CON) 10 mEq tablet Take 80 mEq by mouth daily  , Historical Med       !! - Potential duplicate medications found  Please discuss with provider  No discharge procedures on file  ED Provider  Attending physically available and evaluated Sangita Alfaro  I managed the patient along with the ED Attending      Electronically Signed by         Arlette Atkins MD  09/08/18 0697

## 2018-09-19 ENCOUNTER — APPOINTMENT (OUTPATIENT)
Dept: WOUND CARE | Facility: HOSPITAL | Age: 73
End: 2018-09-19
Payer: MEDICARE

## 2018-09-19 PROCEDURE — 29581 APPL MULTLAYER CMPRN SYS LEG: CPT

## 2018-09-19 PROCEDURE — 99213 OFFICE O/P EST LOW 20 MIN: CPT

## 2018-09-28 ENCOUNTER — APPOINTMENT (OUTPATIENT)
Dept: WOUND CARE | Facility: HOSPITAL | Age: 73
End: 2018-09-28
Payer: MEDICARE

## 2018-09-28 PROCEDURE — 29581 APPL MULTLAYER CMPRN SYS LEG: CPT

## 2018-10-03 ENCOUNTER — APPOINTMENT (OUTPATIENT)
Dept: WOUND CARE | Facility: HOSPITAL | Age: 73
End: 2018-10-03
Payer: MEDICARE

## 2018-10-03 PROCEDURE — 29580 STRAPPING UNNA BOOT: CPT

## 2018-10-10 ENCOUNTER — APPOINTMENT (OUTPATIENT)
Dept: WOUND CARE | Facility: HOSPITAL | Age: 73
End: 2018-10-10
Payer: MEDICARE

## 2018-10-10 PROCEDURE — 99212 OFFICE O/P EST SF 10 MIN: CPT
